# Patient Record
Sex: MALE | Race: OTHER | HISPANIC OR LATINO
[De-identification: names, ages, dates, MRNs, and addresses within clinical notes are randomized per-mention and may not be internally consistent; named-entity substitution may affect disease eponyms.]

---

## 2019-12-06 PROBLEM — Z00.00 ENCOUNTER FOR PREVENTIVE HEALTH EXAMINATION: Status: ACTIVE | Noted: 2019-12-06

## 2019-12-12 ENCOUNTER — APPOINTMENT (OUTPATIENT)
Dept: SURGERY | Facility: CLINIC | Age: 49
End: 2019-12-12
Payer: COMMERCIAL

## 2019-12-12 VITALS
DIASTOLIC BLOOD PRESSURE: 65 MMHG | SYSTOLIC BLOOD PRESSURE: 106 MMHG | WEIGHT: 217 LBS | BODY MASS INDEX: 34.06 KG/M2 | HEIGHT: 67 IN | HEART RATE: 56 BPM | TEMPERATURE: 98.4 F | OXYGEN SATURATION: 100 %

## 2019-12-12 DIAGNOSIS — Z78.9 OTHER SPECIFIED HEALTH STATUS: ICD-10-CM

## 2019-12-12 DIAGNOSIS — Z82.0 FAMILY HISTORY OF EPILEPSY AND OTHER DISEASES OF THE NERVOUS SYSTEM: ICD-10-CM

## 2019-12-12 PROCEDURE — 99243 OFF/OP CNSLTJ NEW/EST LOW 30: CPT

## 2019-12-12 NOTE — REVIEW OF SYSTEMS
[Fever] : no fever [Chills] : no chills [Chest Pain] : no chest pain [Lower Ext Edema] : no extremity edema [Shortness Of Breath] : no shortness of breath [Cough] : no cough [Abdominal Pain] : no abdominal pain [Dysuria] : no dysuria [Hesitancy] : no urinary hesitancy [Testicular Pain] : no testicular pain [Joint Swelling] : no joint swelling [Joint Stiffness] : no joint stiffness [Skin Lesions] : no skin lesions [Skin Wound] : no skin wound [Dizziness] : no dizziness [Anxiety] : no anxiety [Muscle Weakness] : no muscle weakness [Swollen Glands] : no swollen glands [FreeTextEntry8] : discomfort, right side of groin

## 2019-12-12 NOTE — PLAN
[FreeTextEntry1] : Patient with RIGHT inguinal hernia. Patient was informed of significance of findings. All the options, risks and benefits were discussed. The use of mesh and the small potential for infection, recurrence and other related complications were discussed. Patient wishes to proceed with surgery. We will plan for surgery on at the next available date. Patient agrees to obtain any necessary pre-operative evaluations and testing prior to surgery. Patient's questions and concerns addressed to patient's satisfaction, and patient verbalized an understanding of the information discussed.\par \par Will schedule for the surgery at [facility] [place]. \par \par \par

## 2019-12-12 NOTE — CONSULT LETTER
[Dear  ___] : Dear  [unfilled], [Consult Letter:] : I had the pleasure of evaluating your patient, [unfilled]. [Consult Closing:] : Thank you very much for allowing me to participate in the care of this patient.  If you have any questions, please do not hesitate to contact me. [Sincerely,] : Sincerely, [FreeTextEntry3] : Javy Blanton MD\par

## 2019-12-12 NOTE — HISTORY OF PRESENT ILLNESS
[de-identified] : 49 y.o M, no significant PMHX, presents w the cc of having some discomfort and unusual sensation to the right side of groin. He denies feeling a bulge to the area. Patient states he is worried he may have a hernia. Denies testicular swelling, no urinary symptoms. He reports recently having a corticosteroid injection to the RLE and concerned this may have something to do with the inguinal discomfort. He has reduced heavy lifting as part of his daily work and wears an abdominal binder. Patient states he had a sonogram to R. inguinal area and was told he has a hernia.

## 2019-12-12 NOTE — PHYSICAL EXAM
[Normal Breath Sounds] : Normal breath sounds [Normal Rate and Rhythm] : normal rate and rhythm [No Rash or Lesion] : No rash or lesion [Alert] : alert [Oriented to Person] : oriented to person [Oriented to Place] : oriented to place [Oriented to Time] : oriented to time [Calm] : calm [JVD] : no jugular venous distention  [de-identified] : A/Ox3; NAD. appears comfortable [de-identified] : EOMI [de-identified] : abd is soft, NT/ND\par  [de-identified] : +R. inguinal hernia, no testicular swelling  [de-identified] : +ROM, no joint swelling

## 2020-01-08 ENCOUNTER — APPOINTMENT (OUTPATIENT)
Dept: SURGERY | Facility: HOSPITAL | Age: 50
End: 2020-01-08

## 2020-02-25 DIAGNOSIS — K40.90 UNILATERAL INGUINAL HERNIA, W/OUT OBSTRUCTION OR GANGRENE, NOT SPECIFIED AS RECURRENT: ICD-10-CM

## 2020-03-06 ENCOUNTER — OUTPATIENT (OUTPATIENT)
Dept: OUTPATIENT SERVICES | Facility: HOSPITAL | Age: 50
LOS: 1 days | End: 2020-03-06
Payer: COMMERCIAL

## 2020-03-06 VITALS
RESPIRATION RATE: 16 BRPM | TEMPERATURE: 98 F | HEART RATE: 64 BPM | HEIGHT: 67 IN | OXYGEN SATURATION: 99 % | DIASTOLIC BLOOD PRESSURE: 73 MMHG | WEIGHT: 218.04 LBS | SYSTOLIC BLOOD PRESSURE: 114 MMHG

## 2020-03-06 DIAGNOSIS — K40.90 UNILATERAL INGUINAL HERNIA, WITHOUT OBSTRUCTION OR GANGRENE, NOT SPECIFIED AS RECURRENT: ICD-10-CM

## 2020-03-06 DIAGNOSIS — Z01.818 ENCOUNTER FOR OTHER PREPROCEDURAL EXAMINATION: ICD-10-CM

## 2020-03-06 PROCEDURE — G0463: CPT

## 2020-03-06 NOTE — H&P PST ADULT - NSANTHOSAYNRD_GEN_A_CORE
No. STUART screening performed.  STOP BANG Legend: 0-2 = LOW Risk; 3-4 = INTERMEDIATE Risk; 5-8 = HIGH Risk

## 2020-03-06 NOTE — H&P PST ADULT - NSICDXPROBLEM_GEN_ALL_CORE_FT
PROBLEM DIAGNOSES  Problem: Unilateral inguinal hernia, without obstruction or gangrene, not specified as recurrent  Assessment and Plan: Right Inguinal Hernia Repair with Mesh

## 2020-03-06 NOTE — H&P PST ADULT - REASON FOR ADMISSION
I had a feeling of  pain and needle in my right groin area in november 2019. My doctor did confirm that I had a hernia in my right grin, But I felt good after that and I cancelled my surgery. The same feeling started this February, he I am.  I want the surgery

## 2020-03-06 NOTE — H&P PST ADULT - HISTORY OF PRESENT ILLNESS
50yo male with history of Obesity, reports the above.  Patient denies pain at this time. He is scheduled for Right Inguinal Hernia Repair with Mesh on 3/13/20

## 2020-03-12 ENCOUNTER — TRANSCRIPTION ENCOUNTER (OUTPATIENT)
Age: 50
End: 2020-03-12

## 2020-03-13 ENCOUNTER — RESULT REVIEW (OUTPATIENT)
Age: 50
End: 2020-03-13

## 2020-03-13 ENCOUNTER — APPOINTMENT (OUTPATIENT)
Dept: SURGERY | Facility: HOSPITAL | Age: 50
End: 2020-03-13

## 2020-03-13 ENCOUNTER — OUTPATIENT (OUTPATIENT)
Dept: OUTPATIENT SERVICES | Facility: HOSPITAL | Age: 50
LOS: 1 days | End: 2020-03-13
Payer: COMMERCIAL

## 2020-03-13 VITALS
OXYGEN SATURATION: 97 % | DIASTOLIC BLOOD PRESSURE: 59 MMHG | SYSTOLIC BLOOD PRESSURE: 115 MMHG | TEMPERATURE: 98 F | HEART RATE: 67 BPM | RESPIRATION RATE: 17 BRPM

## 2020-03-13 VITALS
HEIGHT: 67 IN | WEIGHT: 218.04 LBS | HEART RATE: 56 BPM | OXYGEN SATURATION: 99 % | DIASTOLIC BLOOD PRESSURE: 65 MMHG | TEMPERATURE: 98 F | SYSTOLIC BLOOD PRESSURE: 115 MMHG | RESPIRATION RATE: 17 BRPM

## 2020-03-13 DIAGNOSIS — K40.90 UNILATERAL INGUINAL HERNIA, WITHOUT OBSTRUCTION OR GANGRENE, NOT SPECIFIED AS RECURRENT: ICD-10-CM

## 2020-03-13 PROCEDURE — C1781: CPT

## 2020-03-13 PROCEDURE — 49505 PRP I/HERN INIT REDUC >5 YR: CPT | Mod: RT

## 2020-03-13 PROCEDURE — 49505 PRP I/HERN INIT REDUC >5 YR: CPT

## 2020-03-13 PROCEDURE — 88304 TISSUE EXAM BY PATHOLOGIST: CPT

## 2020-03-13 PROCEDURE — 88304 TISSUE EXAM BY PATHOLOGIST: CPT | Mod: 26

## 2020-03-13 RX ORDER — SODIUM CHLORIDE 9 MG/ML
3 INJECTION INTRAMUSCULAR; INTRAVENOUS; SUBCUTANEOUS EVERY 8 HOURS
Refills: 0 | Status: DISCONTINUED | OUTPATIENT
Start: 2020-03-13 | End: 2020-03-13

## 2020-03-13 RX ORDER — ONDANSETRON 8 MG/1
4 TABLET, FILM COATED ORAL ONCE
Refills: 0 | Status: DISCONTINUED | OUTPATIENT
Start: 2020-03-13 | End: 2020-03-13

## 2020-03-13 RX ORDER — HYDROMORPHONE HYDROCHLORIDE 2 MG/ML
0.5 INJECTION INTRAMUSCULAR; INTRAVENOUS; SUBCUTANEOUS
Refills: 0 | Status: DISCONTINUED | OUTPATIENT
Start: 2020-03-13 | End: 2020-03-13

## 2020-03-13 RX ORDER — OXYCODONE AND ACETAMINOPHEN 5; 325 MG/1; MG/1
1 TABLET ORAL
Qty: 12 | Refills: 0
Start: 2020-03-13 | End: 2020-03-15

## 2020-03-13 RX ORDER — SODIUM CHLORIDE 9 MG/ML
1000 INJECTION, SOLUTION INTRAVENOUS
Refills: 0 | Status: DISCONTINUED | OUTPATIENT
Start: 2020-03-13 | End: 2020-03-13

## 2020-03-13 RX ORDER — HYDROMORPHONE HYDROCHLORIDE 2 MG/ML
1 INJECTION INTRAMUSCULAR; INTRAVENOUS; SUBCUTANEOUS
Refills: 0 | Status: DISCONTINUED | OUTPATIENT
Start: 2020-03-13 | End: 2020-03-13

## 2020-03-13 RX ADMIN — HYDROMORPHONE HYDROCHLORIDE 1 MILLIGRAM(S): 2 INJECTION INTRAMUSCULAR; INTRAVENOUS; SUBCUTANEOUS at 11:58

## 2020-03-13 RX ADMIN — HYDROMORPHONE HYDROCHLORIDE 1 MILLIGRAM(S): 2 INJECTION INTRAMUSCULAR; INTRAVENOUS; SUBCUTANEOUS at 11:28

## 2020-03-13 NOTE — ASU DISCHARGE PLAN (ADULT/PEDIATRIC) - CALL YOUR DOCTOR IF YOU HAVE ANY OF THE FOLLOWING:
Nausea and vomiting that does not stop/Wound/Surgical Site with redness, or foul smelling discharge or pus/Swelling that gets worse/Pain not relieved by Medications/Bleeding that does not stop/Fever greater than (need to indicate Fahrenheit or Celsius)

## 2020-03-13 NOTE — ASU DISCHARGE PLAN (ADULT/PEDIATRIC) - CARE PROVIDER_API CALL
Javy Blanton (MD)  Surgery  [address], [place] [id number]  Phone: [phone]  Fax: [phone]  Follow Up Time:

## 2020-03-17 LAB — SURGICAL PATHOLOGY STUDY: SIGNIFICANT CHANGE UP

## 2020-03-23 ENCOUNTER — APPOINTMENT (OUTPATIENT)
Dept: SURGERY | Facility: CLINIC | Age: 50
End: 2020-03-23
Payer: COMMERCIAL

## 2020-03-23 PROCEDURE — 99024 POSTOP FOLLOW-UP VISIT: CPT

## 2020-03-23 NOTE — ASSESSMENT
[FreeTextEntry1] : ASHLEY ALFARO presents to the office for postoperative visit today, he is s/p Repair of right inguinal hernia with mesh, 03/13/20. \par Patient offers no complaints. Denies fever, chills, or pain. Surgical wound is healing well. No signs of inflammation, infection or exudate. no recurrence. Patient reports good bowel movements and appetite.\par \par Patient is doing well, with excellent post-operative recovery. Surgical incision is healing well and as expected. There is no evidence of infection or complication, and patient is progressing as expected.\par

## 2020-03-23 NOTE — HISTORY OF PRESENT ILLNESS
[de-identified] : ASHLEY ALFARO presents to the office for postoperative visit today, he is s/p Repair of right inguinal hernia with mesh, 03/13/20. \par Patient offers no complaints. Denies fever, chills, or pain. Surgical wound is healing well. No signs of inflammation, infection or exudate. no recurrence. Patient reports good bowel movements and appetite.\par

## 2020-03-23 NOTE — PLAN
[FreeTextEntry1] : Post operative wound care, activity and restrictions/precautions were reinforced. \par Patient was instructed to refrain from any heavy lifting >10-15 lbs for at least 4 weeks post operatively. \par \par Patient's questions and concerns addressed.\par \par patient will follow up if needed. Warning signs, follow up, and restrictions were discussed with the patient.\par call with concerns

## 2020-03-23 NOTE — REASON FOR VISIT
[Post Op: _________] : a [unfilled] post op visit [Spouse] : spouse [FreeTextEntry1] : s/p Repair of right inguinal hernia with mesh, 03/13/20

## 2020-03-23 NOTE — PHYSICAL EXAM
[Normal Breath Sounds] : Normal breath sounds [Normal Rate and Rhythm] : normal rate and rhythm [No Rash or Lesion] : No rash or lesion [Alert] : alert [Oriented to Person] : oriented to person [Oriented to Place] : oriented to place [Oriented to Time] : oriented to time [Calm] : calm [JVD] : no jugular venous distention  [de-identified] : A/Ox3; NAD. appears comfortable [de-identified] : EOMI, Anicteric sclera [de-identified] : Surgical wound is healing well. No signs of inflammation, infection or exudate. no recurrence. [de-identified] : +ROM, no joint swelling

## 2021-07-30 ENCOUNTER — EMERGENCY (EMERGENCY)
Facility: HOSPITAL | Age: 51
LOS: 1 days | Discharge: HOME | End: 2021-07-30
Attending: EMERGENCY MEDICINE
Payer: COMMERCIAL

## 2021-07-30 VITALS
RESPIRATION RATE: 17 BRPM | TEMPERATURE: 98 F | DIASTOLIC BLOOD PRESSURE: 76 MMHG | SYSTOLIC BLOOD PRESSURE: 117 MMHG | WEIGHT: 218.04 LBS | OXYGEN SATURATION: 98 % | HEART RATE: 73 BPM | HEIGHT: 67 IN

## 2021-07-30 VITALS
HEART RATE: 69 BPM | SYSTOLIC BLOOD PRESSURE: 114 MMHG | OXYGEN SATURATION: 97 % | TEMPERATURE: 98 F | DIASTOLIC BLOOD PRESSURE: 74 MMHG | RESPIRATION RATE: 17 BRPM

## 2021-07-30 LAB
ALBUMIN SERPL ELPH-MCNC: 3.4 G/DL — LOW (ref 3.5–5)
ALP SERPL-CCNC: 102 U/L — SIGNIFICANT CHANGE UP (ref 40–120)
ALT FLD-CCNC: 72 U/L DA — HIGH (ref 10–60)
ANION GAP SERPL CALC-SCNC: 7 MMOL/L — SIGNIFICANT CHANGE UP (ref 5–17)
APPEARANCE UR: CLEAR — SIGNIFICANT CHANGE UP
AST SERPL-CCNC: 116 U/L — HIGH (ref 10–40)
BACTERIA # UR AUTO: ABNORMAL /HPF
BILIRUB SERPL-MCNC: 1.1 MG/DL — SIGNIFICANT CHANGE UP (ref 0.2–1.2)
BILIRUB UR-MCNC: NEGATIVE — SIGNIFICANT CHANGE UP
BUN SERPL-MCNC: 15 MG/DL — SIGNIFICANT CHANGE UP (ref 7–18)
CALCIUM SERPL-MCNC: 9.1 MG/DL — SIGNIFICANT CHANGE UP (ref 8.4–10.5)
CHLORIDE SERPL-SCNC: 107 MMOL/L — SIGNIFICANT CHANGE UP (ref 96–108)
CO2 SERPL-SCNC: 23 MMOL/L — SIGNIFICANT CHANGE UP (ref 22–31)
COLOR SPEC: YELLOW — SIGNIFICANT CHANGE UP
COMMENT - URINE: SIGNIFICANT CHANGE UP
CREAT SERPL-MCNC: 0.91 MG/DL — SIGNIFICANT CHANGE UP (ref 0.5–1.3)
DIFF PNL FLD: NEGATIVE — SIGNIFICANT CHANGE UP
EPI CELLS # UR: ABNORMAL /HPF
GLUCOSE SERPL-MCNC: 81 MG/DL — SIGNIFICANT CHANGE UP (ref 70–99)
GLUCOSE UR QL: NEGATIVE — SIGNIFICANT CHANGE UP
HCT VFR BLD CALC: 44 % — SIGNIFICANT CHANGE UP (ref 39–50)
HGB BLD-MCNC: 14.9 G/DL — SIGNIFICANT CHANGE UP (ref 13–17)
HYPOSEGMENTATION: PRESENT — SIGNIFICANT CHANGE UP
KETONES UR-MCNC: NEGATIVE — SIGNIFICANT CHANGE UP
LEUKOCYTE ESTERASE UR-ACNC: NEGATIVE — SIGNIFICANT CHANGE UP
LG PLATELETS BLD QL AUTO: SLIGHT — SIGNIFICANT CHANGE UP
MANUAL SMEAR VERIFICATION: SIGNIFICANT CHANGE UP
MCHC RBC-ENTMCNC: 29.3 PG — SIGNIFICANT CHANGE UP (ref 27–34)
MCHC RBC-ENTMCNC: 33.9 GM/DL — SIGNIFICANT CHANGE UP (ref 32–36)
MCV RBC AUTO: 86.6 FL — SIGNIFICANT CHANGE UP (ref 80–100)
NITRITE UR-MCNC: NEGATIVE — SIGNIFICANT CHANGE UP
NRBC # BLD: 0 /100 WBCS — SIGNIFICANT CHANGE UP (ref 0–0)
PH UR: 6 — SIGNIFICANT CHANGE UP (ref 5–8)
PLAT MORPH BLD: NORMAL — SIGNIFICANT CHANGE UP
PLATELET # BLD AUTO: 46 K/UL — LOW (ref 150–400)
PLATELET COUNT - ESTIMATE: ABNORMAL
POTASSIUM SERPL-MCNC: 3.6 MMOL/L — SIGNIFICANT CHANGE UP (ref 3.5–5.3)
POTASSIUM SERPL-SCNC: 3.6 MMOL/L — SIGNIFICANT CHANGE UP (ref 3.5–5.3)
PROT SERPL-MCNC: 8.8 G/DL — HIGH (ref 6–8.3)
PROT UR-MCNC: 30 MG/DL
RBC # BLD: 5.08 M/UL — SIGNIFICANT CHANGE UP (ref 4.2–5.8)
RBC # FLD: 14 % — SIGNIFICANT CHANGE UP (ref 10.3–14.5)
RBC BLD AUTO: NORMAL — SIGNIFICANT CHANGE UP
RBC CASTS # UR COMP ASSIST: SIGNIFICANT CHANGE UP /HPF (ref 0–2)
SARS-COV-2 RNA SPEC QL NAA+PROBE: SIGNIFICANT CHANGE UP
SODIUM SERPL-SCNC: 137 MMOL/L — SIGNIFICANT CHANGE UP (ref 135–145)
SP GR SPEC: 1.02 — SIGNIFICANT CHANGE UP (ref 1.01–1.02)
TOXIC GRANULES BLD QL SMEAR: PRESENT — SIGNIFICANT CHANGE UP
UROBILINOGEN FLD QL: 1
WBC # BLD: 4.43 K/UL — SIGNIFICANT CHANGE UP (ref 3.8–10.5)
WBC # FLD AUTO: 4.43 K/UL — SIGNIFICANT CHANGE UP (ref 3.8–10.5)
WBC UR QL: SIGNIFICANT CHANGE UP /HPF (ref 0–5)

## 2021-07-30 PROCEDURE — 70450 CT HEAD/BRAIN W/O DYE: CPT | Mod: 26,MA

## 2021-07-30 PROCEDURE — 99285 EMERGENCY DEPT VISIT HI MDM: CPT

## 2021-07-30 RX ORDER — METOCLOPRAMIDE HCL 10 MG
10 TABLET ORAL ONCE
Refills: 0 | Status: COMPLETED | OUTPATIENT
Start: 2021-07-30 | End: 2021-07-30

## 2021-07-30 RX ORDER — ACETAMINOPHEN 500 MG
650 TABLET ORAL ONCE
Refills: 0 | Status: COMPLETED | OUTPATIENT
Start: 2021-07-30 | End: 2021-07-30

## 2021-07-30 RX ADMIN — Medication 650 MILLIGRAM(S): at 19:47

## 2021-07-30 RX ADMIN — Medication 10 MILLIGRAM(S): at 19:47

## 2021-07-30 NOTE — ED PROVIDER NOTE - NSFOLLOWUPCLINICS_GEN_ALL_ED_FT
[facility]  Internal Medicine  [address] [place] [address]  Phone: [phone]  Fax: [phone]

## 2021-07-30 NOTE — ED PROVIDER NOTE - PROGRESS NOTE DETAILS
Benny HILL: Received sign out from Dr. Smith.  CT a/p reported: No acute findings to explain the patient's symptoms.    Multiple nonspecific enlarged upper abdominal, retroperitoneal and inguinal nodes. Differential diagnosis includes infectious, inflammatory and malignant etiologies, including lymphoma and metastatic disease. Clinical correlation is recommended with further workup as warranted. Pt is well appearing, no guarding to repeat abdominal palpation, able to eat and drink without vomiting. Pt is well appearing, has no new complaints and able to walk with normal gait. Pt is stable for discharge and follow up with medical doctor. Pt educated on care and need for follow up. Discussed anticipatory guidance and return precautions. Questions answered. I had a detailed discussion with the patient and/or guardian regarding the historical points, exam findings, and any diagnostic results supporting the discharge diagnosis.    Pt informed of CT report and will f/u with PMD.

## 2021-07-30 NOTE — ED PROVIDER NOTE - PHYSICAL EXAMINATION
General: pt lying in stretcher, appears stated age and is not in distress  HEENT: AT/NC, pink conjunctiva, anicteric sclerae, EOMI, PERRLA, TMs smooth, grey, intact, with normal landmarks, nasal mucosa pink, no discharge, turbinates not enlarged; moist mucus membranes, tongue well-papillated, good dentition; posterior pharynx shows no erythema or exudates;   Neck: supple, full ROM, trachea midline, no JVD, no cervical LAD, no midline ttp or stepoffs  Lungs: symmetric excursion, b/l clear vesicular breath sounds with no wheezes, crackles, or rhonchi  Heart: rrr, S1, S2 normal; no S3 or S4; no murmurs or rubs  Abd: normal bowel sounds; soft, nontender; negative McBurney's point tenderness, negative Greco's sign, no rebound, no guarding, spleen non-palpable; no hepatomegaly, no masses  Back: no midline spinal tenderness or stepoffs, no costovertebral angle tenderness  Extremities: no clubbing, cyanosis, or edema; no palpable deformities or fractures  Skin: good turgor; no rashes, petechiae, ecchymoses, or jaundice  Pulses: radial, posterior tibialis (PT), dorsalis pedis (DP) all 2+ & symmetric  Neuro: awake, alert, responsive; oriented to person, place and time; cranial nerves intact, EOMI, intact jaw movement, intact facial sensation, no facial asymmetry, hearing intact; no nystagmus, tongue midline; Motor: Normal tone in upper and lower extremities bilaterally strength 5/5; Sensory: intact to pinprick and light touch; Cerebellar: finger-to-nose intact; normal steady gait; negative Romberg’s sign; DTR: biceps, triceps, patellar, 2+, no pronator drift General: pt lying in stretcher, appears stated age and is not in distress  HEENT: AT/NC, pink conjunctiva, anicteric sclerae, EOMI, PERRLA, mmm  Neck: supple, full ROM, trachea midline, no JVD, no cervical LAD, no midline ttp or stepoffs, negative Brudzinski sign  Lungs: symmetric excursion, b/l clear vesicular breath sounds with no wheezes, crackles, or rhonchi  Heart: rrr, S1, S2 normal; no S3 or S4; no murmurs or rubs  Abd: normal bowel sounds; soft, nontender; negative McBurney's point tenderness, negative Greco's sign, no rebound, no guarding, spleen non-palpable; no hepatomegaly, no masses  Back: no midline spinal tenderness or stepoffs, + right flank costovertebral angle tenderness  Extremities: no clubbing, cyanosis, or edema; no palpable deformities or fractures  Skin: good turgor; no rashes, petechiae, ecchymoses, or jaundice  Pulses: radial, posterior tibialis (PT), dorsalis pedis (DP) all 2+ & symmetric  Neuro: awake, alert, responsive; oriented to person, place and time; cranial nerves intact, EOMI, intact jaw movement, intact facial sensation, no facial asymmetry, hearing intact; no nystagmus, tongue midline; Motor: Normal tone in upper and lower extremities bilaterally strength 5/5; Sensory: intact  normal steady gait

## 2021-07-30 NOTE — ED PROVIDER NOTE - NSFOLLOWUPINSTRUCTIONS_ED_ALL_ED_FT
Return to ER immediately if your abdominal pain does not improve, worsens, or persists, if you have fever, vomiting,  blood in stools or you have black stools, unable to eat or drink, have worsening/persistent diarrhea or constipation, any concerns. See your doctor as soon as possible (within 1-2 days).   If you need further assistance for appointments you can contact the [facility] Care Coordinator at [phone]. In addition our outpatient [facility] is located at [address] [place] [address], tele: [phone]. Return to ER immediately if your abdominal pain does not improve, worsens, or persists, if you have fever, vomiting,  blood in stools or you have black stools, unable to eat or drink, have worsening/persistent diarrhea or constipation, any concerns. See your doctor as soon as possible (within 1-2 days).   If you need further assistance for appointments you can contact the [facility] Care Coordinator at [phone]. In addition our outpatient [facility] is located at [address] [place] [address], tele: [phone].    As per radiologist, you should follow up with repeat abdominal cat scan or possible MRI to assess lymph nodes, you should also follow up with hemtologist/oncologist.

## 2021-07-30 NOTE — ED PROVIDER NOTE - OBJECTIVE STATEMENT
49 y/o M patient c/o x5 weeks of left sided headache and intermittent fevers. Patient reports he has a daily headache for 5 weeks associated with fevers (t-max 100.7), with a fever last night of 100.4. Endorses right sided lower back pain and urinary frequency. Patient states headache right now is 2 out of 10. Denies cough, cold, neck stiffness, abdominal pain, focal weakness, numbness, or any other complaints.

## 2021-07-30 NOTE — ED PROVIDER NOTE - PATIENT PORTAL LINK FT
You can access the FollowMyHealth Patient Portal offered by [facility] by registering at the following website: [email or web address]. By joining [employer]’s FollowMyHealth portal, you will also be able to view your health information using other applications (apps) compatible with our system.

## 2021-07-30 NOTE — ED PROVIDER NOTE - CLINICAL SUMMARY MEDICAL DECISION MAKING FREE TEXT BOX
Pt w/ aforementioned presentation concerning for but not limited to hepatobiliary disease, nephrolithiasis, pyelonephritis, colitis, other infection. will get labs, imaging, treat symptoms, monitor and reassess    Pt signed out to Dr. Zapata pending CT a/p

## 2021-07-31 PROCEDURE — 70450 CT HEAD/BRAIN W/O DYE: CPT | Mod: MA

## 2021-07-31 PROCEDURE — 85027 COMPLETE CBC AUTOMATED: CPT

## 2021-07-31 PROCEDURE — 99284 EMERGENCY DEPT VISIT MOD MDM: CPT | Mod: 25

## 2021-07-31 PROCEDURE — 36415 COLL VENOUS BLD VENIPUNCTURE: CPT

## 2021-07-31 PROCEDURE — 80053 COMPREHEN METABOLIC PANEL: CPT

## 2021-07-31 PROCEDURE — 74177 CT ABD & PELVIS W/CONTRAST: CPT | Mod: MA

## 2021-07-31 PROCEDURE — 81001 URINALYSIS AUTO W/SCOPE: CPT

## 2021-07-31 PROCEDURE — 74177 CT ABD & PELVIS W/CONTRAST: CPT | Mod: 26,MA

## 2021-07-31 PROCEDURE — 96374 THER/PROPH/DIAG INJ IV PUSH: CPT | Mod: XU

## 2021-07-31 PROCEDURE — 87635 SARS-COV-2 COVID-19 AMP PRB: CPT

## 2021-09-17 ENCOUNTER — APPOINTMENT (OUTPATIENT)
Dept: COLORECTAL SURGERY | Facility: CLINIC | Age: 51
End: 2021-09-17
Payer: COMMERCIAL

## 2021-09-17 VITALS
TEMPERATURE: 98.1 F | HEART RATE: 60 BPM | SYSTOLIC BLOOD PRESSURE: 106 MMHG | DIASTOLIC BLOOD PRESSURE: 68 MMHG | HEIGHT: 67 IN | BODY MASS INDEX: 32.33 KG/M2 | WEIGHT: 206 LBS

## 2021-09-17 PROCEDURE — 99202 OFFICE O/P NEW SF 15 MIN: CPT | Mod: 25

## 2021-09-17 PROCEDURE — 46600 DIAGNOSTIC ANOSCOPY SPX: CPT

## 2021-09-17 NOTE — HISTORY OF PRESENT ILLNESS
[FreeTextEntry1] : 51 y/o M presents for evaluation of colonoscopy findings, referred by Dr. Cuevas\par PSH of right inguinal hernia repair March 2020\par \par Sigmoidoscopy completed 8/31/21\par - medium hemorrhoids in the anal canal\par - irregular mucosa in distal rectum just proximal to the dentate line. Multiple biopsies taken \par \par Pathology \par A.) rectum, distal, biopsy: colonic mucosa with benign lymphoid aggregates. Features of mucosal prolapse noted\par \par Previous colonoscopy completed 3 years ago, 3 benign polyps removed per patient \par \par Reports intermittent anal pain for the past 6 months to 1 year ago with BM's that takes several hours to resolves. States the pain is more on the right than left\par Denies itching or bleeding\par No use of OTC hemorrhoid medications or sitz baths. Prescribed hydrocortisone ointment BID following colonoscopy. Using as prescribed with slight improvement in symptoms \par BH: 1-2 times daily\par Admits to straining with BM's\par Taking fiber supplement daily for the past 2 weeks with noted improvement\par Working on dietary fiber intake. Currently drinking 4-5 16 oz. bottles of water daily plus seltzer occasionally \par (-) anal receptive sex\par Denies [facility] GI disorders or colorectal cancer\par No ASA/NSAIDs last 7 days

## 2021-09-17 NOTE — ASSESSMENT
[FreeTextEntry1] : Reviewed with the patient that I suspect his symptoms of anal pain are consistent with potential scarring from prior anal fissure disease.  Recommend a course of topical nifedipine ointment and continued efforts at fiber supplementation and increase hydration.  No evidence of significant mucosal abnormalities on examination today however large internal hemorrhoids were apparent.  Recommend return in 4 to 6 weeks for repeat assessment.

## 2021-09-17 NOTE — PHYSICAL EXAM
[Excoriation] : no perianal excoriation [Normal] : was normal [Anterior] : anteriorly [None] : there was no rectal mass  [de-identified] : Area along right anterior anal verge consistent with scarring from prior potential anal fissure. [FreeTextEntry1] : Medical assistant was present for the entire exam.\par \par Anoscopy was performed for evaluation of the patients rectal bleeding  history .\par The risks, benefits and alternatives were reviewed.\par \par A lighted anoscope was passed into the anal canal and the entire anal mucosal surface was inspected..  \par The findings revealed moderate internal hemorrhoids.\par No masses or lesions were identified.\par \par

## 2021-10-22 ENCOUNTER — APPOINTMENT (OUTPATIENT)
Dept: COLORECTAL SURGERY | Facility: CLINIC | Age: 51
End: 2021-10-22
Payer: COMMERCIAL

## 2021-10-22 VITALS
TEMPERATURE: 97.3 F | SYSTOLIC BLOOD PRESSURE: 104 MMHG | WEIGHT: 207 LBS | BODY MASS INDEX: 32.49 KG/M2 | DIASTOLIC BLOOD PRESSURE: 66 MMHG | HEIGHT: 67 IN | HEART RATE: 76 BPM

## 2021-10-22 DIAGNOSIS — K60.2 ANAL FISSURE, UNSPECIFIED: ICD-10-CM

## 2021-10-22 PROCEDURE — 99213 OFFICE O/P EST LOW 20 MIN: CPT

## 2021-10-22 NOTE — ASSESSMENT
[FreeTextEntry1] : Symptoms resolved of prior anal fissure disease.\par \par I have recommended continued aggressive efforts to improve bowel habits with fiber supplementation and increase hydration.

## 2021-10-22 NOTE — HISTORY OF PRESENT ILLNESS
[FreeTextEntry1] : 51 y/o M presents for f/u anal fissure\par \par Last seen in the office on 9/17/21. Anal fissure Area along right anterior anal verge consistent with scarring from prior potential anal fissure. Moderate internal hemorrhoids noted on anoscopy. Patient started on Diltiazem ointment TID \par Pt has been applying medication twice daily and Calmol-4 suppository at bedtime with great improvement. Denies pain, itching, burning or bleeding.\par He has been avoiding red meat and increasing dietary fiber and water\par \par BH: twice daily, admits to slight straining\par Denies constipation, diarrhea\par Takes Metamucil daily\par Denies stool softeners,laxatives\par \par Sigmoidoscopy completed 8/31/21. Hemorrhoids and  irregular mucosa in distal rectum just proximal to the dentate line noted\par Last colonoscopy completed 2017/2018, 3 benign polyps removed per patient\par (-) anal receptive sex\par Denies [facility] GI disorders or colorectal cancer\par Denies ASA/NSAIDs last 7 days \par \par

## 2022-02-04 NOTE — H&P PST ADULT - HEIGHT IN CM
Discharge instructions reviewed and understanding verbalized per pt.  and daughter Desire. Patient aware start Eliquis tonight. Patient practiced incentive spirometry & aware use hourly while awake next couple days. Report given to [facility] PACU RN to complete hand-off procedure. 170.18

## 2022-06-24 NOTE — ASU PATIENT PROFILE, ADULT - AS SC BRADEN NUTRITION
Clive Benson, I saw Arie in the clinic last night. He continues to have lymph node swelling, which I suspect is from his scalp rash. Lymph nodes are quite tender, so he is being treated with a round of Keflex. I advised follow-up with Dermatology for the skin rash and to follow-up with you if lymph node swelling continues after course of antibiotics. We discussed US in clinic, but this is not typically done in Urgent care and he was not able to be seen at the acute and diagnostic center. I suspect that if his swelling does not get better with antibiotics that he would ultimately need a soft tissue US of the head and neck. I do not want to step on your toes, or set unrealistic expectations for the patient.   Thank you! Diaen Spicer
(4) excellent

## 2023-02-23 ENCOUNTER — INPATIENT (INPATIENT)
Facility: HOSPITAL | Age: 53
LOS: 2 days | Discharge: HOME | DRG: 866 | End: 2023-02-26
Attending: STUDENT IN AN ORGANIZED HEALTH CARE EDUCATION/TRAINING PROGRAM | Admitting: STUDENT IN AN ORGANIZED HEALTH CARE EDUCATION/TRAINING PROGRAM
Payer: COMMERCIAL

## 2023-02-23 VITALS
SYSTOLIC BLOOD PRESSURE: 145 MMHG | OXYGEN SATURATION: 95 % | DIASTOLIC BLOOD PRESSURE: 81 MMHG | HEART RATE: 78 BPM | RESPIRATION RATE: 16 BRPM | WEIGHT: 218.04 LBS | TEMPERATURE: 98 F

## 2023-02-23 DIAGNOSIS — Z29.9 ENCOUNTER FOR PROPHYLACTIC MEASURES, UNSPECIFIED: ICD-10-CM

## 2023-02-23 DIAGNOSIS — L03.90 CELLULITIS, UNSPECIFIED: ICD-10-CM

## 2023-02-23 DIAGNOSIS — L03.211 CELLULITIS OF FACE: ICD-10-CM

## 2023-02-23 DIAGNOSIS — B00.7 DISSEMINATED HERPESVIRAL DISEASE: ICD-10-CM

## 2023-02-23 DIAGNOSIS — Z98.890 OTHER SPECIFIED POSTPROCEDURAL STATES: Chronic | ICD-10-CM

## 2023-02-23 LAB
ALBUMIN SERPL ELPH-MCNC: 3.9 G/DL — SIGNIFICANT CHANGE UP (ref 3.5–5)
ALP SERPL-CCNC: 106 U/L — SIGNIFICANT CHANGE UP (ref 40–120)
ALT FLD-CCNC: 63 U/L DA — HIGH (ref 10–60)
ANION GAP SERPL CALC-SCNC: 5 MMOL/L — SIGNIFICANT CHANGE UP (ref 5–17)
AST SERPL-CCNC: 51 U/L — HIGH (ref 10–40)
BASOPHILS # BLD AUTO: 0.03 K/UL — SIGNIFICANT CHANGE UP (ref 0–0.2)
BASOPHILS NFR BLD AUTO: 0.7 % — SIGNIFICANT CHANGE UP (ref 0–2)
BILIRUB SERPL-MCNC: 1.5 MG/DL — HIGH (ref 0.2–1.2)
BUN SERPL-MCNC: 12 MG/DL — SIGNIFICANT CHANGE UP (ref 7–18)
CALCIUM SERPL-MCNC: 9.4 MG/DL — SIGNIFICANT CHANGE UP (ref 8.4–10.5)
CHLORIDE SERPL-SCNC: 106 MMOL/L — SIGNIFICANT CHANGE UP (ref 96–108)
CO2 SERPL-SCNC: 27 MMOL/L — SIGNIFICANT CHANGE UP (ref 22–31)
CREAT SERPL-MCNC: 0.98 MG/DL — SIGNIFICANT CHANGE UP (ref 0.5–1.3)
EGFR: 92 ML/MIN/1.73M2 — SIGNIFICANT CHANGE UP
EOSINOPHIL # BLD AUTO: 0.05 K/UL — SIGNIFICANT CHANGE UP (ref 0–0.5)
EOSINOPHIL NFR BLD AUTO: 1.1 % — SIGNIFICANT CHANGE UP (ref 0–6)
GLUCOSE SERPL-MCNC: 113 MG/DL — HIGH (ref 70–99)
HCT VFR BLD CALC: 49 % — SIGNIFICANT CHANGE UP (ref 39–50)
HGB BLD-MCNC: 16.2 G/DL — SIGNIFICANT CHANGE UP (ref 13–17)
IMM GRANULOCYTES NFR BLD AUTO: 0.4 % — SIGNIFICANT CHANGE UP (ref 0–0.9)
LACTATE SERPL-SCNC: 1.8 MMOL/L — SIGNIFICANT CHANGE UP (ref 0.7–2)
LYMPHOCYTES # BLD AUTO: 1.65 K/UL — SIGNIFICANT CHANGE UP (ref 1–3.3)
LYMPHOCYTES # BLD AUTO: 36.1 % — SIGNIFICANT CHANGE UP (ref 13–44)
MCHC RBC-ENTMCNC: 29 PG — SIGNIFICANT CHANGE UP (ref 27–34)
MCHC RBC-ENTMCNC: 33.1 GM/DL — SIGNIFICANT CHANGE UP (ref 32–36)
MCV RBC AUTO: 87.8 FL — SIGNIFICANT CHANGE UP (ref 80–100)
MONOCYTES # BLD AUTO: 0.48 K/UL — SIGNIFICANT CHANGE UP (ref 0–0.9)
MONOCYTES NFR BLD AUTO: 10.5 % — SIGNIFICANT CHANGE UP (ref 2–14)
NEUTROPHILS # BLD AUTO: 2.34 K/UL — SIGNIFICANT CHANGE UP (ref 1.8–7.4)
NEUTROPHILS NFR BLD AUTO: 51.2 % — SIGNIFICANT CHANGE UP (ref 43–77)
NRBC # BLD: 0 /100 WBCS — SIGNIFICANT CHANGE UP (ref 0–0)
PLATELET # BLD AUTO: 150 K/UL — SIGNIFICANT CHANGE UP (ref 150–400)
POTASSIUM SERPL-MCNC: 3.7 MMOL/L — SIGNIFICANT CHANGE UP (ref 3.5–5.3)
POTASSIUM SERPL-SCNC: 3.7 MMOL/L — SIGNIFICANT CHANGE UP (ref 3.5–5.3)
PROT SERPL-MCNC: 8.8 G/DL — HIGH (ref 6–8.3)
RBC # BLD: 5.58 M/UL — SIGNIFICANT CHANGE UP (ref 4.2–5.8)
RBC # FLD: 13 % — SIGNIFICANT CHANGE UP (ref 10.3–14.5)
SARS-COV-2 RNA SPEC QL NAA+PROBE: SIGNIFICANT CHANGE UP
SODIUM SERPL-SCNC: 138 MMOL/L — SIGNIFICANT CHANGE UP (ref 135–145)
WBC # BLD: 4.57 K/UL — SIGNIFICANT CHANGE UP (ref 3.8–10.5)
WBC # FLD AUTO: 4.57 K/UL — SIGNIFICANT CHANGE UP (ref 3.8–10.5)

## 2023-02-23 PROCEDURE — 99285 EMERGENCY DEPT VISIT HI MDM: CPT

## 2023-02-23 PROCEDURE — 70491 CT SOFT TISSUE NECK W/DYE: CPT | Mod: 26,MA

## 2023-02-23 PROCEDURE — 99222 1ST HOSP IP/OBS MODERATE 55: CPT | Mod: GC

## 2023-02-23 RX ORDER — CEFAZOLIN SODIUM 1 G
2000 VIAL (EA) INJECTION EVERY 8 HOURS
Refills: 0 | Status: DISCONTINUED | OUTPATIENT
Start: 2023-02-23 | End: 2023-02-25

## 2023-02-23 RX ORDER — ACETAMINOPHEN 500 MG
650 TABLET ORAL EVERY 6 HOURS
Refills: 0 | Status: DISCONTINUED | OUTPATIENT
Start: 2023-02-23 | End: 2023-02-26

## 2023-02-23 RX ORDER — VALACYCLOVIR 500 MG/1
1000 TABLET, FILM COATED ORAL THREE TIMES A DAY
Refills: 0 | Status: DISCONTINUED | OUTPATIENT
Start: 2023-02-23 | End: 2023-02-26

## 2023-02-23 RX ORDER — KETOROLAC TROMETHAMINE 30 MG/ML
30 SYRINGE (ML) INJECTION EVERY 6 HOURS
Refills: 0 | Status: DISCONTINUED | OUTPATIENT
Start: 2023-02-23 | End: 2023-02-26

## 2023-02-23 RX ORDER — ONDANSETRON 8 MG/1
4 TABLET, FILM COATED ORAL EVERY 8 HOURS
Refills: 0 | Status: DISCONTINUED | OUTPATIENT
Start: 2023-02-23 | End: 2023-02-26

## 2023-02-23 RX ORDER — TRAMADOL HYDROCHLORIDE 50 MG/1
50 TABLET ORAL EVERY 6 HOURS
Refills: 0 | Status: DISCONTINUED | OUTPATIENT
Start: 2023-02-23 | End: 2023-02-26

## 2023-02-23 RX ORDER — ENOXAPARIN SODIUM 100 MG/ML
40 INJECTION SUBCUTANEOUS EVERY 24 HOURS
Refills: 0 | Status: DISCONTINUED | OUTPATIENT
Start: 2023-02-23 | End: 2023-02-26

## 2023-02-23 RX ORDER — VALACYCLOVIR 500 MG/1
1000 TABLET, FILM COATED ORAL ONCE
Refills: 0 | Status: COMPLETED | OUTPATIENT
Start: 2023-02-23 | End: 2023-02-23

## 2023-02-23 RX ORDER — LANOLIN ALCOHOL/MO/W.PET/CERES
3 CREAM (GRAM) TOPICAL AT BEDTIME
Refills: 0 | Status: DISCONTINUED | OUTPATIENT
Start: 2023-02-23 | End: 2023-02-26

## 2023-02-23 RX ORDER — KETOROLAC TROMETHAMINE 30 MG/ML
15 SYRINGE (ML) INJECTION ONCE
Refills: 0 | Status: DISCONTINUED | OUTPATIENT
Start: 2023-02-23 | End: 2023-02-23

## 2023-02-23 RX ORDER — VANCOMYCIN HCL 1 G
1000 VIAL (EA) INTRAVENOUS ONCE
Refills: 0 | Status: COMPLETED | OUTPATIENT
Start: 2023-02-23 | End: 2023-02-23

## 2023-02-23 RX ORDER — PIPERACILLIN AND TAZOBACTAM 4; .5 G/20ML; G/20ML
3.38 INJECTION, POWDER, LYOPHILIZED, FOR SOLUTION INTRAVENOUS ONCE
Refills: 0 | Status: COMPLETED | OUTPATIENT
Start: 2023-02-23 | End: 2023-02-23

## 2023-02-23 RX ORDER — GABAPENTIN 400 MG/1
100 CAPSULE ORAL
Refills: 0 | Status: DISCONTINUED | OUTPATIENT
Start: 2023-02-23 | End: 2023-02-26

## 2023-02-23 RX ADMIN — PIPERACILLIN AND TAZOBACTAM 3.38 GRAM(S): 4; .5 INJECTION, POWDER, LYOPHILIZED, FOR SOLUTION INTRAVENOUS at 10:44

## 2023-02-23 RX ADMIN — Medication 15 MILLIGRAM(S): at 10:44

## 2023-02-23 RX ADMIN — VALACYCLOVIR 1000 MILLIGRAM(S): 500 TABLET, FILM COATED ORAL at 21:47

## 2023-02-23 RX ADMIN — Medication 1000 MILLIGRAM(S): at 11:19

## 2023-02-23 RX ADMIN — Medication 15 MILLIGRAM(S): at 10:14

## 2023-02-23 RX ADMIN — Medication 250 MILLIGRAM(S): at 10:19

## 2023-02-23 RX ADMIN — Medication 100 MILLIGRAM(S): at 21:47

## 2023-02-23 RX ADMIN — VALACYCLOVIR 1000 MILLIGRAM(S): 500 TABLET, FILM COATED ORAL at 10:51

## 2023-02-23 RX ADMIN — Medication 650 MILLIGRAM(S): at 21:41

## 2023-02-23 RX ADMIN — PIPERACILLIN AND TAZOBACTAM 200 GRAM(S): 4; .5 INJECTION, POWDER, LYOPHILIZED, FOR SOLUTION INTRAVENOUS at 10:16

## 2023-02-23 NOTE — H&P ADULT - NSHPREVIEWOFSYSTEMS_GEN_ALL_CORE
REVIEW OF SYSTEMS:    CONSTITUTIONAL: No weakness, fevers or chills  EYES/ENT: No visual changes;  No vertigo or throat pain   NECK: No pain or stiffness  RESPIRATORY: No cough, wheezing, hemoptysis; No shortness of breath  CARDIOVASCULAR: No chest pain or palpitations  GASTROINTESTINAL: No abdominal or epigastric pain. No nausea, vomiting, or hematemesis; No diarrhea or constipation. No melena or hematochezia.  GENITOURINARY: No dysuria, frequency or hematuria  NEUROLOGICAL: No numbness or weakness  SKIN: + diffuse painful, itchy lesions

## 2023-02-23 NOTE — ED PROVIDER NOTE - PROGRESS NOTE DETAILS
CT soft tissue neck: RIGHT buccal dermal thickening and irregularity consistent   with patient's known rash. Underlying subcutaneous edema and thickening   of the platysma noted consistent with cellulitis. Minimal edema in the   submental triangle and about the RIGHT strap muscles. No drainable   abscess is seen.    Labs are ok but will admit to Medicine given severity of rash and rapidity of progression.

## 2023-02-23 NOTE — H&P ADULT - NSHPPHYSICALEXAM_GEN_ALL_CORE
LOS:     VITALS:   T(C): 36.7 (02-23-23 @ 15:28), Max: 36.8 (02-23-23 @ 09:25)  HR: 67 (02-23-23 @ 15:28) (67 - 78)  BP: 128/81 (02-23-23 @ 15:28) (128/81 - 145/81)  RR: 17 (02-23-23 @ 15:28) (16 - 17)  SpO2: 98% (02-23-23 @ 15:28) (95% - 98%)    GENERAL: NAD, lying in bed comfortably  HEAD:  Atraumatic, Normocephalic  EYES: EOMI, PERRLA, conjunctiva and sclera clear  ENT: Moist mucous membranes  NECK: Supple, No JVD  CHEST/LUNG: Clear to auscultation bilaterally; No rales, rhonchi, wheezing, or rubs. Unlabored respirations  HEART: Regular rate and rhythm; No murmurs, rubs, or gallops  ABDOMEN: BSx4; Soft, nontender, nondistended  EXTREMITIES:  2+ Peripheral Pulses, brisk capillary refill. No clubbing, cyanosis, or edema  NERVOUS SYSTEM:  A&Ox3, no focal deficits   SKIN: + grouped vesicles on an erythematous base on right face and scalp, + draining right facial crusted over plaque, + right earlobe erythema, warmth and swelling, + scattered pustules on B/L arms and chest, one chest lesion scabbed and crusted

## 2023-02-23 NOTE — ED PROVIDER NOTE - OBJECTIVE STATEMENT
52-year-old male no medical hx presenitng with rash/swelling in the R side of his face. Started with a pimple on his lower right face 4 days ago, which he popped and since then he has noted redness and swelling from the pimple site up his face up to his R ear. Notes his earlobe is red and swollen as well, and he cannot hear anything out of his R ear. Rash extends down to his chest. +painful. No visual changes or facial paralysis. Went to his doctor yesterday who prescribed doxycycline which he took for the first time this morning, but notes progressive worsening so he is here in the ER.

## 2023-02-23 NOTE — ED PROVIDER NOTE - CLINICAL SUMMARY MEDICAL DECISION MAKING FREE TEXT BOX
52-year-old male no medical hx presenitng with erythematous vesicualr rash/swelling in the R side of his face, R neck, and R side of chest - possibly disseminated zoster versus cellulitis versus abscess. WIll check labs, perform CT max face w/IV contrast, provide abx and valacyclovir, and reassess. Anticipate admission. 52-year-old male no medical hx presenitng with erythematous vesicualr rash/swelling in the R side of his face, R neck, and R side of chest - possibly disseminated zoster versus cellulitis versus abscess. WIll check labs, perform CT soft tissue neck w/IV contrast, provide abx and valacyclovir, and reassess. Anticipate admission.

## 2023-02-23 NOTE — H&P ADULT - NSHPSOCIALHISTORY_GEN_ALL_CORE
Pt lives at home with his wife and children. States he is unsure about his childhood vaccination hx. Denies alcohol, tobacco or illicit drug use.

## 2023-02-23 NOTE — H&P ADULT - ATTENDING COMMENTS
. Patient is a 52 y/o male from home w/ no PMH and PSH of Rt inguinal hernia repair p/w worsening Rt sided facial lesions and swelling x few days. Pt reports that he first developed a pimple around his chin on the Rt side on Sunday, later he noticed multiple other vesicles developing on his face, ear and even scalp- all lesions on Rt side. He described it as painful lesions, now w/ some yellowish crusting.  He used over the counter benadryl and topical steroids which made it worse. He went to his PCP yesterday who started him on doxycycline. Pt denies having any fever, chills, nausea, vomiting or systemic findings. He denies hx of recent abx use, hospitalization, sick contacts. 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   In ED, patient's VS were stable. He was given valtrex, IV vancomycin and Zosyn.     Labs reviewed    PE as above   Skin: multiple crops of vesicles w/ yellowish crusting in Rt trigeminal area w/ some lesions on neck-C3-4 region- no eye involvement    A/P:  #Disseminated Herpes Zoster w/ super-imposed bacterial infection   #DVT ppx     Plan:  -Patient noted w/ herpetic lesions in Rt trigeminal distribution along w/ additional vesicles and concern for super-imposed bacterial infection. S/p IV vancomycin, zosyn and valtrex in ED.  -Would start valtrex and cefazolin   -pain control, gabapentin for neuropathic pain  -Check HIV panel, Hba1C

## 2023-02-23 NOTE — H&P ADULT - PROBLEM SELECTOR PLAN 1
- p/w scattered vesicles, mainly on face, present on chest, scalp and arm  - start on Valtrex 1000mg TID for HSV  - start on Cefazolin for cellulitus/soft tissue infection  - start on gabapentin 100mg BID for neuropathic pain  - start on PRN pain regimen: tylenol for mild, tramadol for moderate and ketorolac for severe pain  - f/u HIV test  - monitor for ocular or neurological symptoms

## 2023-02-23 NOTE — ED PROVIDER NOTE - SKIN, MLM
R face with erythema, swelling, multiple vesicles across V2 and V3 dermatomes, R earlobe swollen and erythematous as well, multiple areas of purulent crusting. Similar rash on R side of neck and over R side of upper chest.

## 2023-02-23 NOTE — H&P ADULT - PROBLEM SELECTOR PLAN 2
- CT Neck Soft Tissue shows RIGHT buccal dermal thickening and irregularity consistent with patient's known rash. Underlying subcutaneous edema and thickening of the platysma noted consistent with cellulitis. Minimal edema in the submental triangle and about the RIGHT strap muscles. No drainable abscess is seen.  - start on Cefazolin for cellulitus/soft tissue infection  - f/u blood cultures

## 2023-02-23 NOTE — H&P ADULT - HISTORY OF PRESENT ILLNESS
52 year old male, from home, no PMH, presents to the ED with right facial swelling and pain. He states that 5 days ago, he noticed a small pimple on his right cheek which he popped. During the next few days, he began to have increased itchiness, pain and swelling of his right cheek, extending into his right ear lobe. He states he had associated hearing changes in the right ear. Yesterday he noticed new lesions in his scalp, chest and arms. He went to his PCP who prescribed him doxycycline and topical clindamycin. States he's had 1 female sexual partner within the past year and is monogamous with his wife. Denies hx of sexually transmitted diseases. Denies hx of genital lesions. States he had one canker sore about 2 months ago. Denies fevers, chills, headaches, visual changes, chest pain, N/V or abdominal pain.     In ED: vitals HR 67, /81, Temp 98F, 98% on RA  CT Neck Soft Tissue shows RIGHT buccal dermal thickening and irregularity consistent with patient's known rash. Underlying subcutaneous edema and thickening of the platysma noted consistent with cellulitis. Minimal edema in the submental triangle and about the RIGHT strap muscles. No drainable   abscess is seen.    s/p Zosyn, Valtrx and Vanco in ED

## 2023-02-23 NOTE — H&P ADULT - ASSESSMENT
52 year old male, from home, no PMH, presents to the ED with right facial swelling and pain. Admitted to medicine for management of cellulitis and disseminated herpes simplex.

## 2023-02-24 LAB
A1C WITH ESTIMATED AVERAGE GLUCOSE RESULT: 5.6 % — SIGNIFICANT CHANGE UP (ref 4–5.6)
ALBUMIN SERPL ELPH-MCNC: 3.4 G/DL — LOW (ref 3.5–5)
ALP SERPL-CCNC: 90 U/L — SIGNIFICANT CHANGE UP (ref 40–120)
ALT FLD-CCNC: 54 U/L DA — SIGNIFICANT CHANGE UP (ref 10–60)
ANION GAP SERPL CALC-SCNC: 6 MMOL/L — SIGNIFICANT CHANGE UP (ref 5–17)
AST SERPL-CCNC: 43 U/L — HIGH (ref 10–40)
BASOPHILS # BLD AUTO: 0 K/UL — SIGNIFICANT CHANGE UP (ref 0–0.2)
BASOPHILS NFR BLD AUTO: 0 % — SIGNIFICANT CHANGE UP (ref 0–2)
BILIRUB SERPL-MCNC: 1 MG/DL — SIGNIFICANT CHANGE UP (ref 0.2–1.2)
BUN SERPL-MCNC: 16 MG/DL — SIGNIFICANT CHANGE UP (ref 7–18)
CALCIUM SERPL-MCNC: 9 MG/DL — SIGNIFICANT CHANGE UP (ref 8.4–10.5)
CHLORIDE SERPL-SCNC: 105 MMOL/L — SIGNIFICANT CHANGE UP (ref 96–108)
CO2 SERPL-SCNC: 26 MMOL/L — SIGNIFICANT CHANGE UP (ref 22–31)
CREAT SERPL-MCNC: 1.05 MG/DL — SIGNIFICANT CHANGE UP (ref 0.5–1.3)
EGFR: 84 ML/MIN/1.73M2 — SIGNIFICANT CHANGE UP
EOSINOPHIL # BLD AUTO: 0.05 K/UL — SIGNIFICANT CHANGE UP (ref 0–0.5)
EOSINOPHIL NFR BLD AUTO: 1 % — SIGNIFICANT CHANGE UP (ref 0–6)
ESTIMATED AVERAGE GLUCOSE: 114 MG/DL — SIGNIFICANT CHANGE UP (ref 68–114)
GLUCOSE SERPL-MCNC: 106 MG/DL — HIGH (ref 70–99)
HCT VFR BLD CALC: 43.8 % — SIGNIFICANT CHANGE UP (ref 39–50)
HGB BLD-MCNC: 14.5 G/DL — SIGNIFICANT CHANGE UP (ref 13–17)
HIV 1+2 AB+HIV1 P24 AG SERPL QL IA: SIGNIFICANT CHANGE UP
LG PLATELETS BLD QL AUTO: SLIGHT — SIGNIFICANT CHANGE UP
LYMPHOCYTES # BLD AUTO: 1.37 K/UL — SIGNIFICANT CHANGE UP (ref 1–3.3)
LYMPHOCYTES # BLD AUTO: 29 % — SIGNIFICANT CHANGE UP (ref 13–44)
MAGNESIUM SERPL-MCNC: 2.3 MG/DL — SIGNIFICANT CHANGE UP (ref 1.6–2.6)
MANUAL SMEAR VERIFICATION: SIGNIFICANT CHANGE UP
MCHC RBC-ENTMCNC: 29.4 PG — SIGNIFICANT CHANGE UP (ref 27–34)
MCHC RBC-ENTMCNC: 33.1 GM/DL — SIGNIFICANT CHANGE UP (ref 32–36)
MCV RBC AUTO: 88.8 FL — SIGNIFICANT CHANGE UP (ref 80–100)
MONOCYTES # BLD AUTO: 0.52 K/UL — SIGNIFICANT CHANGE UP (ref 0–0.9)
MONOCYTES NFR BLD AUTO: 11 % — SIGNIFICANT CHANGE UP (ref 2–14)
NEUTROPHILS # BLD AUTO: 2.21 K/UL — SIGNIFICANT CHANGE UP (ref 1.8–7.4)
NEUTROPHILS NFR BLD AUTO: 47 % — SIGNIFICANT CHANGE UP (ref 43–77)
NRBC # BLD: 0 /100 — SIGNIFICANT CHANGE UP (ref 0–0)
PHOSPHATE SERPL-MCNC: 3.7 MG/DL — SIGNIFICANT CHANGE UP (ref 2.5–4.5)
PLAT MORPH BLD: NORMAL — SIGNIFICANT CHANGE UP
PLATELET # BLD AUTO: 132 K/UL — LOW (ref 150–400)
PLATELET COUNT - ESTIMATE: ABNORMAL
POTASSIUM SERPL-MCNC: 4.1 MMOL/L — SIGNIFICANT CHANGE UP (ref 3.5–5.3)
POTASSIUM SERPL-SCNC: 4.1 MMOL/L — SIGNIFICANT CHANGE UP (ref 3.5–5.3)
PROT SERPL-MCNC: 7.6 G/DL — SIGNIFICANT CHANGE UP (ref 6–8.3)
RBC # BLD: 4.93 M/UL — SIGNIFICANT CHANGE UP (ref 4.2–5.8)
RBC # FLD: 12.9 % — SIGNIFICANT CHANGE UP (ref 10.3–14.5)
RBC BLD AUTO: NORMAL — SIGNIFICANT CHANGE UP
SODIUM SERPL-SCNC: 137 MMOL/L — SIGNIFICANT CHANGE UP (ref 135–145)
VARIANT LYMPHS # BLD: 12 % — HIGH (ref 0–6)
WBC # BLD: 4.71 K/UL — SIGNIFICANT CHANGE UP (ref 3.8–10.5)
WBC # FLD AUTO: 4.71 K/UL — SIGNIFICANT CHANGE UP (ref 3.8–10.5)

## 2023-02-24 PROCEDURE — 99222 1ST HOSP IP/OBS MODERATE 55: CPT

## 2023-02-24 PROCEDURE — 99233 SBSQ HOSP IP/OBS HIGH 50: CPT

## 2023-02-24 RX ADMIN — VALACYCLOVIR 1000 MILLIGRAM(S): 500 TABLET, FILM COATED ORAL at 21:13

## 2023-02-24 RX ADMIN — Medication 100 MILLIGRAM(S): at 13:08

## 2023-02-24 RX ADMIN — Medication 100 MILLIGRAM(S): at 05:01

## 2023-02-24 RX ADMIN — Medication 100 MILLIGRAM(S): at 21:13

## 2023-02-24 RX ADMIN — Medication 30 MILLIGRAM(S): at 06:30

## 2023-02-24 RX ADMIN — GABAPENTIN 100 MILLIGRAM(S): 400 CAPSULE ORAL at 17:33

## 2023-02-24 RX ADMIN — ENOXAPARIN SODIUM 40 MILLIGRAM(S): 100 INJECTION SUBCUTANEOUS at 12:41

## 2023-02-24 RX ADMIN — Medication 60 MILLIGRAM(S): at 17:29

## 2023-02-24 RX ADMIN — TRAMADOL HYDROCHLORIDE 50 MILLIGRAM(S): 50 TABLET ORAL at 14:12

## 2023-02-24 RX ADMIN — TRAMADOL HYDROCHLORIDE 50 MILLIGRAM(S): 50 TABLET ORAL at 15:14

## 2023-02-24 RX ADMIN — VALACYCLOVIR 1000 MILLIGRAM(S): 500 TABLET, FILM COATED ORAL at 05:01

## 2023-02-24 RX ADMIN — Medication 30 MILLIGRAM(S): at 06:00

## 2023-02-24 RX ADMIN — VALACYCLOVIR 1000 MILLIGRAM(S): 500 TABLET, FILM COATED ORAL at 13:08

## 2023-02-24 RX ADMIN — GABAPENTIN 100 MILLIGRAM(S): 400 CAPSULE ORAL at 05:01

## 2023-02-24 NOTE — PATIENT PROFILE ADULT - FALL HARM RISK - UNIVERSAL INTERVENTIONS
Bed in lowest position, wheels locked, appropriate side rails in place/Call bell, personal items and telephone in reach/Instruct patient to call for assistance before getting out of bed or chair/Non-slip footwear when patient is out of bed/Adrian to call system/Physically safe environment - no spills, clutter or unnecessary equipment/Purposeful Proactive Rounding/Room/bathroom lighting operational, light cord in reach

## 2023-02-24 NOTE — CONSULT NOTE ADULT - SUBJECTIVE AND OBJECTIVE BOX
HPI:  52 year old male, from home, no PMH, presents to the ED with right facial swelling and pain. He states that 5 days PTA, he noticed a small pimple on his right cheek which he popped. During the next few days, he began to have increased itchiness, pain and swelling of his right cheek, extending into his right ear lobe. He states he had associated hearing changes in the right ear. Yesterday he noticed new lesions in his scalp, chest and arms. He went to his PCP who prescribed him doxycycline and topical clindamycin. States he's had 1 female sexual partner within the past year and is monogamous with his wife. Denies hx of sexually transmitted diseases. Denies hx of genital lesions. States he had one canker sore about 2 months ago. Denies fevers, chills, headaches, visual changes, chest pain, N/V or abdominal pain.  CT Neck Soft Tissue shows RIGHT buccal dermal thickening and irregularity consistent with patient's known rash. Underlying subcutaneous edema and thickening of the platysma noted consistent with cellulitis. Minimal edema in the submental triangle and about the RIGHT strap muscles. No drainable abscess is seen. Given zosyn and vanco in the ED, then started on valacyclovir and cefazolin.    Additional Hx at the time of the ID consult: does not recall having chicken pox when younger; no travel or known sick contacts. Says he was diagnosed with kidney cancer one yr ago.        PAST MEDICAL & SURGICAL HISTORY:  Obesity    H/O hernia repair    kidney cancer          MEDS:  acetaminophen     Tablet .. 650 milliGRAM(s) Oral every 6 hours PRN  aluminum hydroxide/magnesium hydroxide/simethicone Suspension 30 milliLiter(s) Oral every 4 hours PRN  ceFAZolin   IVPB 2000 milliGRAM(s) IV Intermittent every 8 hours (D2)  enoxaparin Injectable 40 milliGRAM(s) SubCutaneous every 24 hours  gabapentin 100 milliGRAM(s) Oral two times a day  ketorolac   Injectable 30 milliGRAM(s) IV Push every 6 hours PRN  melatonin 3 milliGRAM(s) Oral at bedtime PRN  ondansetron Injectable 4 milliGRAM(s) IV Push every 8 hours PRN  traMADol 50 milliGRAM(s) Oral every 6 hours PRN  valACYclovir 1000 milliGRAM(s) Oral three times a day (D2)      ALLERGIES  No Known Allergies      SOCIAL HISTORY: emigrated from [place] >25y ago;  and lives with family; no cigs, no ETOH; works as a       FAMILY HISTORY:    FH: Alzheimers disease  Father        ROS:    General: no fever or chills	    Skin: see HPI  	  HEENT: see HPI; no vision complaints    Respiratory and Thorax: no cough, no SOB  	  Cardiovascular:	no CP    GI: no N, V, diarrhea	    Genitourinary:	no urinary tract complaints    Musculoskeletal:	 denies arthralgias, myalgias    Neurological:	decreased hearing on R        PHYSICAL EXAM:    Vital Signs Last 24 Hrs  T(C): 36.8 (24 Feb 2023 05:00), Max: 36.9 (24 Feb 2023 00:30)  T(F): 98.3 (24 Feb 2023 05:00), Max: 98.4 (24 Feb 2023 00:30)  HR: 74 (24 Feb 2023 05:00) (67 - 81)  BP: 125/59 (24 Feb 2023 05:00) (116/70 - 131/62)  BP(mean): --  RR: 17 (24 Feb 2023 05:00) (17 - 18)  SpO2: 97% (24 Feb 2023 05:00) (97% - 98%)    Parameters below as of 24 Feb 2023 05:00  Patient On (Oxygen Delivery Method): room air          Gen:    HEENT:    Neck:    Lymph Nodes:    Breasts:    Back:    Chest/Thorax:    Cardiovascular:    Gastrointestinal:    Genitourinary:    Rectal:    Extremities:    Vascular:    Neurological:    Skin:    Psychiatric:    LABS/DIAGNOSTIC TESTS:                          14.5   4.71  )-----------( 132      ( 24 Feb 2023 06:10 )             43.8     WBC Count: 4.71 K/uL (02-24 @ 06:10)  WBC Count: 4.57 K/uL (02-23 @ 10:00)      02-24    137  |  105  |  16  ----------------------------<  106<H>  4.1   |  26  |  1.05    Ca    9.0      24 Feb 2023 06:10  Phos  3.7     02-24  Mg     2.3     02-24    TPro  7.6  /  Alb  3.4<L>  /  TBili  1.0  /  DBili  x   /  AST  43<H>  /  ALT  54  /  AlkPhos  90  02-24          LIVER FUNCTIONS - ( 24 Feb 2023 06:10 )  Alb: 3.4 g/dL / Pro: 7.6 g/dL / ALK PHOS: 90 U/L / ALT: 54 U/L DA / AST: 43 U/L / GGT: x                 LACTATE:    ABG -     CULTURES:       RADIOLOGY               HPI:  52 year old male, from home presents to the ED with right facial swelling and pain. He states that 5 days PTA, he noticed a small pimple on his right cheek which he popped. During the next few days, he began to have increased itchiness, pain and swelling of his right cheek, extending into his right ear. He states he had associated hearing changes in the right ear. On the day PTA, he noticed new lesions in his scalp, chest and arms. He went to his PCP who prescribed him doxycycline and topical clindamycin. States he's had 1 female sexual partner within the past year and is monogamous with his wife. Denies hx of sexually transmitted diseases. Denies hx of genital lesions. States he had one canker sore about 2 months ago. Denies fevers, chills, headaches, visual changes, chest pain, N/V or abdominal pain.  CT Neck Soft Tissue shows RIGHT buccal dermal thickening and irregularity consistent with patient's known rash. Underlying subcutaneous edema and thickening of the platysma noted consistent with cellulitis. Minimal edema in the submental triangle and about the RIGHT strap muscles. No drainable abscess is seen. Given zosyn and vanco in the ED, then started on valacyclovir and cefazolin.    Additional Hx at the time of the ID consult: does not recall having chicken pox when younger; no travel or known sick contacts. States he has developed new lesions since yesterday on head, arms, and abd.         PAST MEDICAL & SURGICAL HISTORY:  Obesity    H/O hernia repair    kidney cyst          MEDS:  acetaminophen     Tablet .. 650 milliGRAM(s) Oral every 6 hours PRN  aluminum hydroxide/magnesium hydroxide/simethicone Suspension 30 milliLiter(s) Oral every 4 hours PRN  ceFAZolin   IVPB 2000 milliGRAM(s) IV Intermittent every 8 hours (D2)  enoxaparin Injectable 40 milliGRAM(s) SubCutaneous every 24 hours  gabapentin 100 milliGRAM(s) Oral two times a day  ketorolac   Injectable 30 milliGRAM(s) IV Push every 6 hours PRN  melatonin 3 milliGRAM(s) Oral at bedtime PRN  ondansetron Injectable 4 milliGRAM(s) IV Push every 8 hours PRN  traMADol 50 milliGRAM(s) Oral every 6 hours PRN  valACYclovir 1000 milliGRAM(s) Oral three times a day (D2)      ALLERGIES  No Known Allergies      SOCIAL HISTORY: emigrated from [place] >25y ago;  and lives with family; no cigs, no ETOH; works as a       FAMILY HISTORY:  Alzheimers disease  Father      ROS:    General: no fever or chills	    Skin: see HPI  	  HEENT: see HPI; no vision complaints    Respiratory and Thorax: no cough, no SOB  	  Cardiovascular:	no CP    GI: no N, V, diarrhea	    Genitourinary:	no urinary tract complaints    Musculoskeletal:	 denies arthralgias, myalgias    Neurological:	decreased hearing on R        PHYSICAL EXAM:    Vital Signs Last 24 Hrs  T(C): 36.8 (24 Feb 2023 05:00), Max: 36.9 (24 Feb 2023 00:30)  T(F): 98.3 (24 Feb 2023 05:00), Max: 98.4 (24 Feb 2023 00:30)  HR: 74 (24 Feb 2023 05:00) (67 - 81)  BP: 125/59 (24 Feb 2023 05:00) (116/70 - 131/62)  BP(mean): --  RR: 17 (24 Feb 2023 05:00) (17 - 18)  SpO2: 97% (24 Feb 2023 05:00) (97% - 98%)    Parameters below as of 24 Feb 2023 05:00  Patient On (Oxygen Delivery Method): room air          Gen: WD obese male in NAD    HEENT: multiple crusted lesions on R side of face, R ear with some vesicles also noted; sparse lesions on R side of head; R earlobe swollen; no conjunctival lesions    Neck: supple; mostly crusted lesions primarily on R side of neck    Lymph Nodes: cd not appreciate any enlarged submand, cervical or supraclav LNs    Back: no CVAT; sparse vesicular lesions on back    Chest/Thorax: multiple lesions that are generally crusted although some vesicular lesions noted on upper chest bilat.    Cardiovascular: S1S2 reg with no murmurs, gallops, rubs    ABD: soft and non-tender with active BS    Genitourinary: no hayden    Extremities:  sparse distribution of vesicles on arms and thighs    Neurological: A+Ox3    Skin: predominance of crusted lesions on R side of face, R ear, and R upper neck; vesicular lesions also noted on neck, including L side, upper chest bilat, upper back bilat, abd, and both arms and legs    Psychiatric: affect appropriate      LABS/DIAGNOSTIC TESTS:                          14.5   4.71  )-----------( 132      ( 24 Feb 2023 06:10 )             43.8     WBC Count: 4.71 K/uL (02-24 @ 06:10)  WBC Count: 4.57 K/uL (02-23 @ 10:00)      02-24    137  |  105  |  16  ----------------------------<  106<H>  4.1   |  26  |  1.05    Ca    9.0      24 Feb 2023 06:10  Phos  3.7     02-24  Mg     2.3     02-24    TPro  7.6  /  Alb  3.4<L>  /  TBili  1.0  /  DBili  x   /  AST  43<H>  /  ALT  54  /  AlkPhos  90  02-24          LIVER FUNCTIONS - ( 24 Feb 2023 06:10 )  Alb: 3.4 g/dL / Pro: 7.6 g/dL / ALK PHOS: 90 U/L / ALT: 54 U/L DA / AST: 43 U/L / GGT: x                 RADIOLOGY:  < from: CT Neck Soft Tissue w/ IV Cont (02.23.23 @ 13:34) >  ACC: [id number] EXAM:  CT NECK SOFT TISSUE IC   ORDERED BY: DENISA QUINTANILLA     PROCEDURE DATE:  02/23/2023          INTERPRETATION:  CT neck without contrast    CLINICAL INFORMATION: RIGHT facial rash/swelling    TECHNIQUE:  Contiguous axial 3 mm thick sections were obtained through   the neck using single helical acquisition.   3 mm sagittal and coronal   reconstructions were obtained.  This scan was performed using automatic   exposure control (radiation dose reduction software) to obtain a   diagnostic image quality scan with patient dose as low as reasonably   achievable.    FINDINGS:   No prior similar studies are available for review.    RIGHT buccal dermal thickening and irregularity consistent with patient's   known rash. Underlying subcutaneous edema and thickening of the platysma   noted consistent with cellulitis. Minimal edema in the submental triangle   and about the RIGHT strap muscles. No drainable abscess is seen.    Reactive cervical lymph nodes are noted, the largest in the LEFT level 1A   region measuring 1.2 x 1.0 cm.  The visualized lymph nodes demonstrate no   central necrosis or extranodal extension, allowing for the noncontrast   technique.    The mucosal surfaces of the upper aerodigestive tract appear symmetric   and unremarkable.  The larynx is intact.  The preepiglottic and   paralaryngeal spaces are intact.  Laryngeal cartilages remain intact.    The nasopharynx is symmetric.  No lateral retropharyngeal mass is found.    The underlying central skull base is intact.  The petrous temporal bones   including mastoid air cells are intact.  The visualized base of brain   appears unremarkable.    The parotid and submandibular glands are intact.  The thyroid gland is   intact.    The visualized paranasal sinuses are clear.  The nasal cavity is   unremarkable.    The cervical spine appears intact.    The lung apices show minimal dependent atelectasis in the posterior upper   lobes., allowing for the for the neck CT technique.      IMPRESSION: RIGHT buccal dermal thickening and irregularity consistent   with patient's known rash. Underlying subcutaneous edema and thickening   of the platysma noted consistent with cellulitis. Minimal edema in the   submental triangle and about the RIGHT strap muscles.No drainable   abscess is seen.                       HPI:  52 year old male, from home presents to the ED with right facial swelling and pain. He states that 5 days PTA, he noticed a small pimple on his right cheek which he popped. During the next few days, he began to have increased itchiness, pain and swelling of his right cheek, extending into his right ear. He states he had associated hearing changes in the right ear. On the day PTA, he noticed new lesions in his scalp, chest and arms. He went to his PCP who prescribed him doxycycline and topical clindamycin. States he's had 1 female sexual partner within the past year and is monogamous with his wife. Denies hx of sexually transmitted diseases. Denies hx of genital lesions. States he had one canker sore about 2 months ago. Denies fevers, chills, headaches, visual changes, chest pain, N/V or abdominal pain.  CT Neck Soft Tissue shows RIGHT buccal dermal thickening and irregularity consistent with patient's known rash. Underlying subcutaneous edema and thickening of the platysma noted consistent with cellulitis. Minimal edema in the submental triangle and about the RIGHT strap muscles. No drainable abscess is seen. Given zosyn and vanco in the ED, then started on valacyclovir and cefazolin.    Additional Hx at the time of the ID consult: does not recall having chicken pox when younger; no travel or known sick contacts. States he has developed new lesions since yesterday on head, arms, and abd.         PAST MEDICAL & SURGICAL HISTORY:  Obesity    H/O hernia repair    kidney cyst          MEDS:  acetaminophen     Tablet .. 650 milliGRAM(s) Oral every 6 hours PRN  aluminum hydroxide/magnesium hydroxide/simethicone Suspension 30 milliLiter(s) Oral every 4 hours PRN  ceFAZolin   IVPB 2000 milliGRAM(s) IV Intermittent every 8 hours (D2)  enoxaparin Injectable 40 milliGRAM(s) SubCutaneous every 24 hours  gabapentin 100 milliGRAM(s) Oral two times a day  ketorolac   Injectable 30 milliGRAM(s) IV Push every 6 hours PRN  melatonin 3 milliGRAM(s) Oral at bedtime PRN  ondansetron Injectable 4 milliGRAM(s) IV Push every 8 hours PRN  traMADol 50 milliGRAM(s) Oral every 6 hours PRN  valACYclovir 1000 milliGRAM(s) Oral three times a day (D2)      ALLERGIES  No Known Allergies      SOCIAL HISTORY: emigrated from [place] >25y ago;  and lives with family; no cigs, no ETOH; works as a       FAMILY HISTORY:  Alzheimers disease  Father      ROS:    General: no fever or chills	    Skin: see HPI  	  HEENT: see HPI; no vision complaints    Respiratory and Thorax: no cough, no SOB  	  Cardiovascular:	no CP    GI: no N, V, diarrhea	    Genitourinary:	no urinary tract complaints    Musculoskeletal:	 denies arthralgias, myalgias    Neurological:	decreased hearing on R        PHYSICAL EXAM:    Vital Signs Last 24 Hrs  T(C): 36.8 (24 Feb 2023 05:00), Max: 36.9 (24 Feb 2023 00:30)  T(F): 98.3 (24 Feb 2023 05:00), Max: 98.4 (24 Feb 2023 00:30)  HR: 74 (24 Feb 2023 05:00) (67 - 81)  BP: 125/59 (24 Feb 2023 05:00) (116/70 - 131/62)  BP(mean): --  RR: 17 (24 Feb 2023 05:00) (17 - 18)  SpO2: 97% (24 Feb 2023 05:00) (97% - 98%)    Parameters below as of 24 Feb 2023 05:00  Patient On (Oxygen Delivery Method): room air      Gen: WD obese male in NAD    HEENT: multiple crusted lesions on R side of face, R ear with some vesicles also noted; sparse lesions on R side of head; R earlobe swollen; no conjunctival lesions    Neck: supple; mostly crusted lesions primarily on R side of neck    Lymph Nodes: cd not appreciate any enlarged submand, cervical or supraclav LNs    Back: no CVAT; sparse vesicular lesions on back    Chest/Thorax: multiple lesions that are generally crusted although some vesicular lesions noted on upper chest bilat.    Cardiovascular: S1S2 reg with no murmurs, gallops, rubs    ABD: soft and non-tender with active BS    Genitourinary: no hayden    Extremities:  sparse distribution of vesicles on arms and thighs    Neurological: A+Ox3; Cr n grossly intact (including VII)    Skin: predominance of crusted lesions on R side of face, R ear, and R upper neck; vesicular lesions also noted on neck, including L side, upper chest bilat, upper back bilat, abd, and both arms and legs    Psychiatric: affect appropriate      LABS/DIAGNOSTIC TESTS:                          14.5   4.71  )-----------( 132      ( 24 Feb 2023 06:10 )             43.8     WBC Count: 4.71 K/uL (02-24 @ 06:10)  WBC Count: 4.57 K/uL (02-23 @ 10:00)      02-24    137  |  105  |  16  ----------------------------<  106<H>  4.1   |  26  |  1.05    Ca    9.0      24 Feb 2023 06:10  Phos  3.7     02-24  Mg     2.3     02-24    TPro  7.6  /  Alb  3.4<L>  /  TBili  1.0  /  DBili  x   /  AST  43<H>  /  ALT  54  /  AlkPhos  90  02-24          LIVER FUNCTIONS - ( 24 Feb 2023 06:10 )  Alb: 3.4 g/dL / Pro: 7.6 g/dL / ALK PHOS: 90 U/L / ALT: 54 U/L DA / AST: 43 U/L / GGT: x                 RADIOLOGY:  < from: CT Neck Soft Tissue w/ IV Cont (02.23.23 @ 13:34) >  ACC: [id number] EXAM:  CT NECK SOFT TISSUE IC   ORDERED BY: DENISA QUINTANILLA     PROCEDURE DATE:  02/23/2023          INTERPRETATION:  CT neck without contrast    CLINICAL INFORMATION: RIGHT facial rash/swelling    TECHNIQUE:  Contiguous axial 3 mm thick sections were obtained through   the neck using single helical acquisition.   3 mm sagittal and coronal   reconstructions were obtained.  This scan was performed using automatic   exposure control (radiation dose reduction software) to obtain a   diagnostic image quality scan with patient dose as low as reasonably   achievable.    FINDINGS:   No prior similar studies are available for review.    RIGHT buccal dermal thickening and irregularity consistent with patient's   known rash. Underlying subcutaneous edema and thickening of the platysma   noted consistent with cellulitis. Minimal edema in the submental triangle   and about the RIGHT strap muscles. No drainable abscess is seen.    Reactive cervical lymph nodes are noted, the largest in the LEFT level 1A   region measuring 1.2 x 1.0 cm.  The visualized lymph nodes demonstrate no   central necrosis or extranodal extension, allowing for the noncontrast   technique.    The mucosal surfaces of the upper aerodigestive tract appear symmetric   and unremarkable.  The larynx is intact.  The preepiglottic and   paralaryngeal spaces are intact.  Laryngeal cartilages remain intact.    The nasopharynx is symmetric.  No lateral retropharyngeal mass is found.    The underlying central skull base is intact.  The petrous temporal bones   including mastoid air cells are intact.  The visualized base of brain   appears unremarkable.    The parotid and submandibular glands are intact.  The thyroid gland is   intact.    The visualized paranasal sinuses are clear.  The nasal cavity is   unremarkable.    The cervical spine appears intact.    The lung apices show minimal dependent atelectasis in the posterior upper   lobes., allowing for the for the neck CT technique.      IMPRESSION: RIGHT buccal dermal thickening and irregularity consistent   with patient's known rash. Underlying subcutaneous edema and thickening   of the platysma noted consistent with cellulitis. Minimal edema in the   submental triangle and about the RIGHT strap muscles.No drainable   abscess is seen.

## 2023-02-24 NOTE — PROGRESS NOTE ADULT - ASSESSMENT
52 year old male, from home, no PMH, presents to the ED with right facial swelling, pain, and R ear swelling. Admitted to medicine for cellulitis and disseminated herpes simplex, CT neck/soft tissue showed Cellulitis, negative for abscess.  52 year old male, from home, no PMH, presents to the ED with right facial swelling, pain, and R ear swelling. Admitted to medicine for cellulitis and disseminated herpes simplex, started on Valtrex and IV Cefazolin. CT neck/soft tissue showed Cellulitis, negative for abscess. ID Dr. Diaz consulted.

## 2023-02-24 NOTE — PROGRESS NOTE ADULT - PROBLEM SELECTOR PLAN 1
- p/w scattered vesicles, mainly on face, present on chest, scalp and arm  - continue Valtrex 1000mg TID for HSV  - continue Cefazolin for cellulitus/soft tissue infection  - continue gabapentin 100mg BID for neuropathic pain  - f/u HIV test  - monitor for ocular or neurological symptoms  - ID *_*_*_*_* - p/w scattered vesicles, mainly on face, present on chest, scalp and arm  - continue Valtrex 1000mg TID for HSV  - continue Cefazolin for cellulitus/soft tissue infection  - continue gabapentin 100mg BID for neuropathic pain  - f/u HIV test  - monitor for ocular or neurological symptoms  - ID Dr. Diaz consulted

## 2023-02-24 NOTE — PROGRESS NOTE ADULT - SUBJECTIVE AND OBJECTIVE BOX
NP Note discussed with  Primary Attending    Patient is a 52y old  Male who presents with a chief complaint of cellulitis (23 Feb 2023 16:46)      INTERVAL HPI/OVERNIGHT EVENTS: no acute events dinner    MEDICATIONS  (STANDING):  ceFAZolin   IVPB 2000 milliGRAM(s) IV Intermittent every 8 hours  enoxaparin Injectable 40 milliGRAM(s) SubCutaneous every 24 hours  gabapentin 100 milliGRAM(s) Oral two times a day  valACYclovir 1000 milliGRAM(s) Oral three times a day    MEDICATIONS  (PRN):  acetaminophen     Tablet .. 650 milliGRAM(s) Oral every 6 hours PRN Temp greater or equal to 38C (100.4F), Mild Pain (1 - 3)  aluminum hydroxide/magnesium hydroxide/simethicone Suspension 30 milliLiter(s) Oral every 4 hours PRN Dyspepsia  ketorolac   Injectable 30 milliGRAM(s) IV Push every 6 hours PRN Severe Pain (7 - 10)  melatonin 3 milliGRAM(s) Oral at bedtime PRN Insomnia  ondansetron Injectable 4 milliGRAM(s) IV Push every 8 hours PRN Nausea and/or Vomiting  traMADol 50 milliGRAM(s) Oral every 6 hours PRN Moderate Pain (4 - 6)      __________________________________________________  REVIEW OF SYSTEMS:    CONSTITUTIONAL: No fever,   EYES: no acute visual disturbances  EARS: R ear swelling  RESPIRATORY: No cough; No shortness of breath  CARDIOVASCULAR: No chest pain, no palpitations  GASTROINTESTINAL: No pain. No nausea or vomiting; No diarrhea   NEUROLOGICAL: No headache or numbness, no tremors  MUSCULOSKELETAL: No joint pain, no muscle pain  GENITOURINARY: no dysuria, no frequency, no hesitancy  INTEGUMENTARY: new small "pimples" to face and leg  PSYCHIATRY: no depression , no anxiety  ALL OTHER  ROS negative        Vital Signs Last 24 Hrs  T(C): 36.8 (24 Feb 2023 05:00), Max: 36.9 (24 Feb 2023 00:30)  T(F): 98.3 (24 Feb 2023 05:00), Max: 98.4 (24 Feb 2023 00:30)  HR: 74 (24 Feb 2023 05:00) (67 - 81)  BP: 125/59 (24 Feb 2023 05:00) (116/70 - 131/62)  BP(mean): --  RR: 17 (24 Feb 2023 05:00) (17 - 18)  SpO2: 97% (24 Feb 2023 05:00) (97% - 98%)    Parameters below as of 24 Feb 2023 05:00  Patient On (Oxygen Delivery Method): room air        ________________________________________________  PHYSICAL EXAM:  GENERAL: NAD  HEENT: Normocephalic;  conjunctivae and sclerae clear  NECK : supple  CHEST/LUNG: Clear to auscultation bilaterally  HEART: S1 S2  RRR  ABDOMEN: Soft, Nontender, Nondistended; Bowel sounds present  EXTREMITIES: no cyanosis; no edema  SKIN: + rash to Right jaw line, anterior chest wall,   NERVOUS SYSTEM:  Awake and alert; Oriented  to place, person and time    _________________________________________________  LABS:                        14.5   4.71  )-----------( 132      ( 24 Feb 2023 06:10 )             43.8     02-24    137  |  105  |  16  ----------------------------<  106<H>  4.1   |  26  |  1.05    Ca    9.0      24 Feb 2023 06:10  Phos  3.7     02-24  Mg     2.3     02-24    TPro  7.6  /  Alb  3.4<L>  /  TBili  1.0  /  DBili  x   /  AST  43<H>  /  ALT  54  /  AlkPhos  90  02-24        CAPILLARY BLOOD GLUCOSE            RADIOLOGY & ADDITIONAL TESTS:  < from: CT Neck Soft Tissue w/ IV Cont (02.23.23 @ 13:34) >  IMPRESSION: RIGHT buccal dermal thickening and irregularity consistent   with patient's known rash. Underlying subcutaneous edema and thickening   of the platysma noted consistent with cellulitis. Minimal edema in the   submental triangle and about the RIGHT strap muscles.No drainable   abscess is seen.    < end of copied text >        Imaging Personally Reviewed:  YES    Consultant(s) Notes Reviewed:   YES      Plan of care was discussed with patient and /or primary care giver; all questions and concerns were addressed and care was aligned with patient's wishes.     NP Note discussed with  Primary Attending    Patient is a 52y old  Male who presents with a chief complaint of cellulitis (23 Feb 2023 16:46)      INTERVAL HPI/OVERNIGHT EVENTS: no acute events overnight    MEDICATIONS  (STANDING):  ceFAZolin   IVPB 2000 milliGRAM(s) IV Intermittent every 8 hours  enoxaparin Injectable 40 milliGRAM(s) SubCutaneous every 24 hours  gabapentin 100 milliGRAM(s) Oral two times a day  valACYclovir 1000 milliGRAM(s) Oral three times a day    MEDICATIONS  (PRN):  acetaminophen     Tablet .. 650 milliGRAM(s) Oral every 6 hours PRN Temp greater or equal to 38C (100.4F), Mild Pain (1 - 3)  aluminum hydroxide/magnesium hydroxide/simethicone Suspension 30 milliLiter(s) Oral every 4 hours PRN Dyspepsia  ketorolac   Injectable 30 milliGRAM(s) IV Push every 6 hours PRN Severe Pain (7 - 10)  melatonin 3 milliGRAM(s) Oral at bedtime PRN Insomnia  ondansetron Injectable 4 milliGRAM(s) IV Push every 8 hours PRN Nausea and/or Vomiting  traMADol 50 milliGRAM(s) Oral every 6 hours PRN Moderate Pain (4 - 6)      __________________________________________________  REVIEW OF SYSTEMS:    CONSTITUTIONAL: No fever,   EYES: no acute visual disturbances  EARS: R ear swelling  RESPIRATORY: No cough; No shortness of breath  CARDIOVASCULAR: No chest pain, no palpitations  GASTROINTESTINAL: No pain. No nausea or vomiting; No diarrhea   NEUROLOGICAL: No headache or numbness, no tremors  MUSCULOSKELETAL: No joint pain, no muscle pain  GENITOURINARY: no dysuria, no frequency, no hesitancy  INTEGUMENTARY: + new small "pimples" to face and leg  PSYCHIATRY: no depression , no anxiety  ALL OTHER  ROS negative        Vital Signs Last 24 Hrs  T(C): 36.8 (24 Feb 2023 05:00), Max: 36.9 (24 Feb 2023 00:30)  T(F): 98.3 (24 Feb 2023 05:00), Max: 98.4 (24 Feb 2023 00:30)  HR: 74 (24 Feb 2023 05:00) (67 - 81)  BP: 125/59 (24 Feb 2023 05:00) (116/70 - 131/62)  BP(mean): --  RR: 17 (24 Feb 2023 05:00) (17 - 18)  SpO2: 97% (24 Feb 2023 05:00) (97% - 98%)    Parameters below as of 24 Feb 2023 05:00  Patient On (Oxygen Delivery Method): room air        ________________________________________________  PHYSICAL EXAM:  GENERAL: NAD  HEENT: Normocephalic;  conjunctivae and sclerae clear  NECK : supple  CHEST/LUNG: Clear to auscultation bilaterally  HEART: S1 S2  RRR  ABDOMEN: Soft, Nontender, Nondistended; Bowel sounds present  EXTREMITIES: no cyanosis; no edema  SKIN: + rash to Right jaw line, anterior chest wall, R EAR lobe swelling and erythema  NERVOUS SYSTEM:  Awake and alert; Oriented  to place, person and time    _________________________________________________  LABS:                        14.5   4.71  )-----------( 132      ( 24 Feb 2023 06:10 )             43.8     02-24    137  |  105  |  16  ----------------------------<  106<H>  4.1   |  26  |  1.05    Ca    9.0      24 Feb 2023 06:10  Phos  3.7     02-24  Mg     2.3     02-24    TPro  7.6  /  Alb  3.4<L>  /  TBili  1.0  /  DBili  x   /  AST  43<H>  /  ALT  54  /  AlkPhos  90  02-24        CAPILLARY BLOOD GLUCOSE            RADIOLOGY & ADDITIONAL TESTS:  < from: CT Neck Soft Tissue w/ IV Cont (02.23.23 @ 13:34) >  IMPRESSION: RIGHT buccal dermal thickening and irregularity consistent   with patient's known rash. Underlying subcutaneous edema and thickening   of the platysma noted consistent with cellulitis. Minimal edema in the   submental triangle and about the RIGHT strap muscles.No drainable   abscess is seen.    < end of copied text >        Imaging Personally Reviewed:  YES    Consultant(s) Notes Reviewed:   YES      Plan of care was discussed with patient and /or primary care giver; all questions and concerns were addressed and care was aligned with patient's wishes.

## 2023-02-24 NOTE — PROGRESS NOTE ADULT - PROBLEM SELECTOR PLAN 2
- CT Neck Soft Tissue shows RIGHT buccal dermal thickening and irregularity consistent with patient's known rash. Underlying subcutaneous edema and thickening of the platysma noted consistent with cellulitis. Minimal edema in the submental triangle and about the RIGHT strap muscles. No drainable abscess is seen.  - continue Cefazolin  - f/u blood cx

## 2023-02-24 NOTE — CONSULT NOTE ADULT - ASSESSMENT
52 year old male presents to the ED with right facial and ear swelling and pain. 5 days PTA, he noticed a small pimple on his right cheek which he popped. Rash started on R side of face, ear and neck and has since spread to the upper chest, back, abd, both arms and legs. Lesions are at various stages of evolution including vesicles and crusting c/w disseminated zoster vs varicella. 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     Recommend:  --cont. valacyclovir  --d/c cefazolin and Rx R earlobe cellulitis with keflex  --send vesicular fluid for Cx  --start steroids 52 year old male presents to the ED with right facial and ear swelling and pain. 5 days PTA, he noticed a small pimple on his right cheek which he popped. Rash started on R side of face, ear and neck and has since spread to the upper chest, back, abd, both arms and legs. Lesions at various stages of evolution --vesicles and crusted-- and involving multiple dermatomes c/w disseminated zoster Dx (vs varicella). Pt has no known underlying immunosuppressive disorder. Involvement of R side of face/ear with otalgia but without Cr N VII paralysis not c/w Dave Early syndrome.     Recommend:  --cont. valacyclovir  --d/c cefazolin and Rx R earlobe cellulitis with keflex  --send vesicular fluid for viral Cx  --consider steroids  --airborne isolation until all lesions crust  --VZV IgM and IgG    Discussed above with medical team, including Dr. Beltrán.

## 2023-02-24 NOTE — PROGRESS NOTE ADULT - NS ATTEND AMEND GEN_ALL_CORE FT
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 ID consulted, Dr. iDaz, vesicular eruptions in multiple dermatomes from disseminated zoster. Continue on airborne and contact isolation. Continue valcyclovir and cefazolin for cellulitis. 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 HIV pending.

## 2023-02-25 ENCOUNTER — TRANSCRIPTION ENCOUNTER (OUTPATIENT)
Age: 53
End: 2023-02-25

## 2023-02-25 LAB
ALBUMIN SERPL ELPH-MCNC: 3.6 G/DL — SIGNIFICANT CHANGE UP (ref 3.5–5)
ALP SERPL-CCNC: 101 U/L — SIGNIFICANT CHANGE UP (ref 40–120)
ALT FLD-CCNC: 52 U/L DA — SIGNIFICANT CHANGE UP (ref 10–60)
ANION GAP SERPL CALC-SCNC: 6 MMOL/L — SIGNIFICANT CHANGE UP (ref 5–17)
AST SERPL-CCNC: 44 U/L — HIGH (ref 10–40)
BILIRUB SERPL-MCNC: 0.7 MG/DL — SIGNIFICANT CHANGE UP (ref 0.2–1.2)
BUN SERPL-MCNC: 17 MG/DL — SIGNIFICANT CHANGE UP (ref 7–18)
CALCIUM SERPL-MCNC: 9.3 MG/DL — SIGNIFICANT CHANGE UP (ref 8.4–10.5)
CHLORIDE SERPL-SCNC: 104 MMOL/L — SIGNIFICANT CHANGE UP (ref 96–108)
CO2 SERPL-SCNC: 23 MMOL/L — SIGNIFICANT CHANGE UP (ref 22–31)
CREAT SERPL-MCNC: 1 MG/DL — SIGNIFICANT CHANGE UP (ref 0.5–1.3)
EGFR: 89 ML/MIN/1.73M2 — SIGNIFICANT CHANGE UP
GLUCOSE SERPL-MCNC: 158 MG/DL — HIGH (ref 70–99)
HCT VFR BLD CALC: 44.5 % — SIGNIFICANT CHANGE UP (ref 39–50)
HGB BLD-MCNC: 14.9 G/DL — SIGNIFICANT CHANGE UP (ref 13–17)
MAGNESIUM SERPL-MCNC: 2.4 MG/DL — SIGNIFICANT CHANGE UP (ref 1.6–2.6)
MCHC RBC-ENTMCNC: 29.9 PG — SIGNIFICANT CHANGE UP (ref 27–34)
MCHC RBC-ENTMCNC: 33.5 GM/DL — SIGNIFICANT CHANGE UP (ref 32–36)
MCV RBC AUTO: 89.2 FL — SIGNIFICANT CHANGE UP (ref 80–100)
MRSA PCR RESULT.: SIGNIFICANT CHANGE UP
NRBC # BLD: 0 /100 WBCS — SIGNIFICANT CHANGE UP (ref 0–0)
PHOSPHATE SERPL-MCNC: 3.7 MG/DL — SIGNIFICANT CHANGE UP (ref 2.5–4.5)
PLATELET # BLD AUTO: 159 K/UL — SIGNIFICANT CHANGE UP (ref 150–400)
POTASSIUM SERPL-MCNC: 3.9 MMOL/L — SIGNIFICANT CHANGE UP (ref 3.5–5.3)
POTASSIUM SERPL-SCNC: 3.9 MMOL/L — SIGNIFICANT CHANGE UP (ref 3.5–5.3)
PROT SERPL-MCNC: 8.6 G/DL — HIGH (ref 6–8.3)
RBC # BLD: 4.99 M/UL — SIGNIFICANT CHANGE UP (ref 4.2–5.8)
RBC # FLD: 12.6 % — SIGNIFICANT CHANGE UP (ref 10.3–14.5)
S AUREUS DNA NOSE QL NAA+PROBE: DETECTED
SODIUM SERPL-SCNC: 133 MMOL/L — LOW (ref 135–145)
WBC # BLD: 6.15 K/UL — SIGNIFICANT CHANGE UP (ref 3.8–10.5)
WBC # FLD AUTO: 6.15 K/UL — SIGNIFICANT CHANGE UP (ref 3.8–10.5)

## 2023-02-25 PROCEDURE — 99232 SBSQ HOSP IP/OBS MODERATE 35: CPT

## 2023-02-25 PROCEDURE — 99231 SBSQ HOSP IP/OBS SF/LOW 25: CPT

## 2023-02-25 RX ORDER — CEPHALEXIN 500 MG
500 CAPSULE ORAL
Refills: 0 | Status: DISCONTINUED | OUTPATIENT
Start: 2023-02-25 | End: 2023-02-26

## 2023-02-25 RX ADMIN — GABAPENTIN 100 MILLIGRAM(S): 400 CAPSULE ORAL at 18:11

## 2023-02-25 RX ADMIN — GABAPENTIN 100 MILLIGRAM(S): 400 CAPSULE ORAL at 05:34

## 2023-02-25 RX ADMIN — Medication 500 MILLIGRAM(S): at 18:11

## 2023-02-25 RX ADMIN — VALACYCLOVIR 1000 MILLIGRAM(S): 500 TABLET, FILM COATED ORAL at 22:44

## 2023-02-25 RX ADMIN — VALACYCLOVIR 1000 MILLIGRAM(S): 500 TABLET, FILM COATED ORAL at 05:35

## 2023-02-25 RX ADMIN — VALACYCLOVIR 1000 MILLIGRAM(S): 500 TABLET, FILM COATED ORAL at 14:55

## 2023-02-25 RX ADMIN — TRAMADOL HYDROCHLORIDE 50 MILLIGRAM(S): 50 TABLET ORAL at 05:42

## 2023-02-25 RX ADMIN — Medication 500 MILLIGRAM(S): at 12:58

## 2023-02-25 RX ADMIN — TRAMADOL HYDROCHLORIDE 50 MILLIGRAM(S): 50 TABLET ORAL at 06:12

## 2023-02-25 RX ADMIN — Medication 60 MILLIGRAM(S): at 18:11

## 2023-02-25 RX ADMIN — Medication 100 MILLIGRAM(S): at 05:34

## 2023-02-25 RX ADMIN — Medication 500 MILLIGRAM(S): at 23:32

## 2023-02-25 RX ADMIN — ENOXAPARIN SODIUM 40 MILLIGRAM(S): 100 INJECTION SUBCUTANEOUS at 12:58

## 2023-02-25 NOTE — DISCHARGE NOTE PROVIDER - NSDCCPGOAL_GEN_ALL_CORE_FT
Alert and oriented x4. Denies SOB. Tolerating regular diet. Denies nausea and vomiting. Active bowel sounds x4q. Assist of 1 with GB/W. Back and hip pain was managed with PRN oxycodone.      Pt was discharged on 10/25/22. Pt was medically stable upon discharge. AVS, including medications instructions were reviewed with pt. Pt verbally admitted to understanding the instructions. She was accompanied to home by her spouse.    To get better and follow your care plan as instructed.

## 2023-02-25 NOTE — DISCHARGE NOTE PROVIDER - NSDCCPCAREPLAN_GEN_ALL_CORE_FT
PRINCIPAL DISCHARGE DIAGNOSIS  Diagnosis: Disseminated herpes simplex  Assessment and Plan of Treatment: Shingles is a viral infection that causes a painful rash. Shingles is caused by the varicella-zoster virus. This is the same virus that causes chickenpox. The virus stays in your body after you have chickenpox, without causing any symptoms. Shingles occurs when the virus becomes active again. The active virus travels along a nerve to your skin and causes a rash. The rash usually lasts 2 to 3 weeks.  Call your local emergency number (911 in the ) if:  You have trouble moving your arms, legs, or face.  You become confused, or have trouble speaking.  You have a seizure.  Medicines: You may need any of the following:  Antiviral medicine fights the virus causing your shingles.   Self-care:  Apply a cool, wet compress or take a cool bath. This may help decrease itching and pain.  Keep your rash clean and dry. You are contagious until your blisters scab over. Stay away from people who have not had chickenpox or the chickenpox vaccine. Avoid pregnant women, newborns, and people with weak immune systems. They have a higher risk of infection.  Wash your hands often.   Prevent shingles or another shingles outbreak:  A vaccine may be given to help prevent shingles. You can get the vaccine even if you already had shingles. The vaccine comes in 2 forms. A 2-dose vaccine is usually given to adults 50 years or older. A 1-dose vaccine may be given to adults 60 years or older.  The vaccine can help prevent a future outbreak.   Follow up with your doctor      SECONDARY DISCHARGE DIAGNOSES  Diagnosis: Cellulitis  Assessment and Plan of Treatment: Take all of your antibiotics as ordered.  Call your Health Care Provider within two days of arriving home to make a follow up appointment within one week.  If the affected cellulitic area increases in redness, warmth, pain or swelling call your Health Care Provider.  If you develop fever, chills, and/or malaise, call your Health Care Provider.       PRINCIPAL DISCHARGE DIAGNOSIS  Diagnosis: Disseminated herpes simplex  Assessment and Plan of Treatment: Shingles is a viral infection that causes a painful rash. Shingles is caused by the varicella-zoster virus. This is the same virus that causes chickenpox. The virus stays in your body after you have chickenpox, without causing any symptoms. Shingles occurs when the virus becomes active again. The active virus travels along a nerve to your skin and causes a rash. The rash usually lasts 2 to 3 weeks.  Call your local emergency number (911 in the ) if:  You have trouble moving your arms, legs, or face.  You become confused, or have trouble speaking.  You have a seizure.  Medicines: You may need any of the following:  Antiviral medicine fights the virus causing your shingles.   Self-care:  Apply a cool, wet compress or take a cool bath. This may help decrease itching and pain.  Keep your rash clean and dry. You are contagious until your blisters scab over. Stay away from people who have not had chickenpox or the chickenpox vaccine. Avoid pregnant women, newborns, and people with weak immune systems. They have a higher risk of infection.  Wash your hands often.   Prevent shingles or another shingles outbreak:  A vaccine may be given to help prevent shingles. You can get the vaccine even if you already had shingles. The vaccine comes in 2 forms. A 2-dose vaccine is usually given to adults 50 years or older. A 1-dose vaccine may be given to adults 60 years or older.  The vaccine can help prevent a future outbreak.   Follow up with your doctor      SECONDARY DISCHARGE DIAGNOSES  Diagnosis: Cellulitis  Assessment and Plan of Treatment: Cellulitis is a soft tissue infection of the skin. YOu were treated with IV antibiotics while in the hospital. please take all of your antibiotics as ordered. Call your Health Care Provider within two days of arriving home to make a follow up appointment within one week.  If the affected cellulitic area increases in redness, warmth, pain or swelling call your Health Care Provider.  If you develop fever, chills, and/or malaise, call your Health Care Provider.

## 2023-02-25 NOTE — PROGRESS NOTE ADULT - SUBJECTIVE AND OBJECTIVE BOX
Subjective/Objective:      MEDS  acetaminophen     Tablet .. 650 milliGRAM(s) Oral every 6 hours PRN  aluminum hydroxide/magnesium hydroxide/simethicone Suspension 30 milliLiter(s) Oral every 4 hours PRN  ceFAZolin   IVPB 2000 milliGRAM(s) IV Intermittent every 8 hours (D3)  enoxaparin Injectable 40 milliGRAM(s) SubCutaneous every 24 hours  gabapentin 100 milliGRAM(s) Oral two times a day  ketorolac   Injectable 30 milliGRAM(s) IV Push every 6 hours PRN  melatonin 3 milliGRAM(s) Oral at bedtime PRN  ondansetron Injectable 4 milliGRAM(s) IV Push every 8 hours PRN  predniSONE   Tablet 60 milliGRAM(s) Oral every 24 hours  traMADol 50 milliGRAM(s) Oral every 6 hours PRN  valACYclovir 1000 milliGRAM(s) Oral three times a day (D3)      PHYSICAL EXAM:    Vital Signs Last 24 Hrs  T(C): 36.4 (25 Feb 2023 05:12), Max: 37 (24 Feb 2023 20:20)  T(F): 97.6 (25 Feb 2023 05:12), Max: 98.6 (24 Feb 2023 20:20)  HR: 72 (25 Feb 2023 05:12) (65 - 79)  BP: 123/69 (25 Feb 2023 05:12) (123/69 - 133/79)  BP(mean): --  RR: 18 (25 Feb 2023 05:12) (16 - 18)  SpO2: 96% (25 Feb 2023 05:12) (96% - 98%)    Parameters below as of 25 Feb 2023 05:12  Patient On (Oxygen Delivery Method): room air        GEN:    HEENT:    CHEST/Respiratory:    Cardiovascular:    Abdomen:    Genitourinary:    Extremities:     Neurological:    Skin:      LABS/DIAGNOSTIC TESTS                            14.9   6.15  )-----------( 159      ( 25 Feb 2023 06:35 )             44.5       WBC Count: 6.15 K/uL (02-25 @ 06:35)  WBC Count: 4.71 K/uL (02-24 @ 06:10)  WBC Count: 4.57 K/uL (02-23 @ 10:00)      02-25    133<L>  |  104  |  17  ----------------------------<  158<H>  3.9   |  23  |  1.00    Ca    9.3      25 Feb 2023 06:35  Phos  3.7     02-25  Mg     2.4     02-25    TPro  8.6<H>  /  Alb  3.6  /  TBili  0.7  /  DBili  x   /  AST  44<H>  /  ALT  52  /  AlkPhos  101  02-25    HIV-1/2 Antigen/Antibody Screen by [facility] (02.24.23 @ 06:10)   HIV-1/2 Combo Result: Nonreact        CULTURES    Culture - Blood (collected 02-23-23 @ 10:00)  Source: .Blood Blood-Peripheral  Preliminary Report (02-24-23 @ 14:02):    No growth to date.    Culture - Blood (collected 02-23-23 @ 10:00)  Source: .Blood Blood-Peripheral  Preliminary Report (02-24-23 @ 14:02):    No growth to date.                         Subjective/Objective: Pt says pain on R side of face and ear decreased; still with some difficulty hearing; not aware of any additional lesions since yesterday. On steroids, valacyclovir, and cefazolin.      MEDS  acetaminophen     Tablet .. 650 milliGRAM(s) Oral every 6 hours PRN  aluminum hydroxide/magnesium hydroxide/simethicone Suspension 30 milliLiter(s) Oral every 4 hours PRN  ceFAZolin   IVPB 2000 milliGRAM(s) IV Intermittent every 8 hours (D3)  enoxaparin Injectable 40 milliGRAM(s) SubCutaneous every 24 hours  gabapentin 100 milliGRAM(s) Oral two times a day  ketorolac   Injectable 30 milliGRAM(s) IV Push every 6 hours PRN  melatonin 3 milliGRAM(s) Oral at bedtime PRN  ondansetron Injectable 4 milliGRAM(s) IV Push every 8 hours PRN  predniSONE   Tablet 60 milliGRAM(s) Oral every 24 hours  traMADol 50 milliGRAM(s) Oral every 6 hours PRN  valACYclovir 1000 milliGRAM(s) Oral three times a day (D3)      PHYSICAL EXAM:    Vital Signs Last 24 Hrs  T(C): 36.4 (25 Feb 2023 05:12), Max: 37 (24 Feb 2023 20:20)  T(F): 97.6 (25 Feb 2023 05:12), Max: 98.6 (24 Feb 2023 20:20)  HR: 72 (25 Feb 2023 05:12) (65 - 79)  BP: 123/69 (25 Feb 2023 05:12) (123/69 - 133/79)  BP(mean): --  RR: 18 (25 Feb 2023 05:12) (16 - 18)  SpO2: 96% (25 Feb 2023 05:12) (96% - 98%)    Parameters below as of 25 Feb 2023 05:12  Patient On (Oxygen Delivery Method): room air      Gen: WD obese male in NAD    HEENT: multiple crusted lesions on R side of face, R ear with some vesicles also noted; sparse lesions on R side of head; R earlobe swelling decreased; no conjunctival lesions    Neck: supple; mostly crusted lesions primarily on R side of neck    Back: no CVAT; sparse distribution of lesions on back now crusting    Chest/Thorax: multiple lesions that are generally crusted     Cardiovascular: S1S2 reg with no murmurs, gallops, rubs    ABD: soft and non-tender with active BS    Genitourinary: no hayden    Extremities:  sparse distribution of vesicles on arms and thighs are crusting    Neurological: A+Ox3; Cr n grossly intact (including VII)    Skin: predominance of crusted lesions on R side of face, R ear, and R upper neck; vesicular lesions also noted on neck, including L side, upper chest bilat, upper back bilat, abd, and both arms and legs appear to be crusting; no new vesicles        LABS/DIAGNOSTIC TESTS                            14.9   6.15  )-----------( 159      ( 25 Feb 2023 06:35 )             44.5       WBC Count: 6.15 K/uL (02-25 @ 06:35)  WBC Count: 4.71 K/uL (02-24 @ 06:10)  WBC Count: 4.57 K/uL (02-23 @ 10:00)      02-25    133<L>  |  104  |  17  ----------------------------<  158<H>  3.9   |  23  |  1.00    Ca    9.3      25 Feb 2023 06:35  Phos  3.7     02-25  Mg     2.4     02-25    TPro  8.6<H>  /  Alb  3.6  /  TBili  0.7  /  DBili  x   /  AST  44<H>  /  ALT  52  /  AlkPhos  101  02-25    HIV-1/2 Antigen/Antibody Screen by [facility] (02.24.23 @ 06:10)   HIV-1/2 Combo Result: Nonreact        CULTURES    Culture - Blood (collected 02-23-23 @ 10:00)  Source: .Blood Blood-Peripheral  Preliminary Report (02-24-23 @ 14:02):    No growth to date.    Culture - Blood (collected 02-23-23 @ 10:00)  Source: .Blood Blood-Peripheral  Preliminary Report (02-24-23 @ 14:02):    No growth to date.

## 2023-02-25 NOTE — PROGRESS NOTE ADULT - SUBJECTIVE AND OBJECTIVE BOX
S: No acute overnight events. Patient still reports decreased hearing on right ear. Reports improvement in facial pain.    O:  Vital Signs Last 24 Hrs  T(C): 36.6 (25 Feb 2023 13:10), Max: 37 (24 Feb 2023 20:20)  T(F): 97.9 (25 Feb 2023 13:10), Max: 98.6 (24 Feb 2023 20:20)  HR: 69 (25 Feb 2023 13:10) (65 - 72)  BP: 107/60 (25 Feb 2023 13:10) (107/60 - 129/67)  BP(mean): --  RR: 18 (25 Feb 2023 13:10) (18 - 18)  SpO2: 97% (25 Feb 2023 13:10) (96% - 98%)    Parameters below as of 25 Feb 2023 13:10  Patient On (Oxygen Delivery Method): room air        GENERAL: NAD, well-developed  HEAD:  Atraumatic, Normocephalic, scabbed lesions on face, scalp, right ear  EYES: EOMI, PERRLA, conjunctiva and sclera clear  NECK: Supple, No JVD  CHEST/LUNG: Clear to auscultation bilaterally; No wheeze, lesions on chest that are starting to crust over  HEART: Regular rate and rhythm; No murmurs, rubs, or gallops  ABDOMEN: Soft, Nontender, Nondistended; Bowel sounds present  EXTREMITIES:  2+ Peripheral Pulses, No clubbing, cyanosis, or edema  PSYCH: AAOx3  NEUROLOGY: non-focal  SKIN: No rashes or lesions    acetaminophen     Tablet .. 650 milliGRAM(s) Oral every 6 hours PRN  aluminum hydroxide/magnesium hydroxide/simethicone Suspension 30 milliLiter(s) Oral every 4 hours PRN  cephalexin 500 milliGRAM(s) Oral four times a day  enoxaparin Injectable 40 milliGRAM(s) SubCutaneous every 24 hours  gabapentin 100 milliGRAM(s) Oral two times a day  ketorolac   Injectable 30 milliGRAM(s) IV Push every 6 hours PRN  melatonin 3 milliGRAM(s) Oral at bedtime PRN  ondansetron Injectable 4 milliGRAM(s) IV Push every 8 hours PRN  predniSONE   Tablet 60 milliGRAM(s) Oral every 24 hours  traMADol 50 milliGRAM(s) Oral every 6 hours PRN  valACYclovir 1000 milliGRAM(s) Oral three times a day                            14.9   6.15  )-----------( 159      ( 25 Feb 2023 06:35 )             44.5       02-25    133<L>  |  104  |  17  ----------------------------<  158<H>  3.9   |  23  |  1.00    Ca    9.3      25 Feb 2023 06:35  Phos  3.7     02-25  Mg     2.4     02-25    TPro  8.6<H>  /  Alb  3.6  /  TBili  0.7  /  DBili  x   /  AST  44<H>  /  ALT  52  /  AlkPhos  101  02-25

## 2023-02-25 NOTE — DISCHARGE NOTE PROVIDER - ATTENDING DISCHARGE PHYSICAL EXAMINATION:
GENERAL: NAD, well-developed  HEAD:  Atraumatic, Normocephalic, lesions over right face, and right ear as well as over chest  EYES: EOMI, PERRLA, conjunctiva and sclera clear  NECK: Supple, No JVD  CHEST/LUNG: Clear to auscultation bilaterally; No wheeze  HEART: Regular rate and rhythm; No murmurs, rubs, or gallops  ABDOMEN: Soft, Nontender, Nondistended; Bowel sounds present  EXTREMITIES:  2+ Peripheral Pulses, No clubbing, cyanosis, or edema  PSYCH: AAOx3  NEUROLOGY: decreased hearing in right ear  SKIN: No rashes or lesions

## 2023-02-25 NOTE — DISCHARGE NOTE PROVIDER - HOSPITAL COURSE
52 year old male, from home, no PMH, presents to the ED with right facial swelling, pain, and R ear swelling. Admitted to medicine for cellulitis and disseminated herpes simplex, started on Valtrex and IV Cefazolin. CT neck/soft tissue showed Cellulitis, negative for abscess. ID Dr. Diaz consulted.     Problem/Plan - 1:  ·  Problem: Disseminated herpes simplex.   ·  Plan: - p/w scattered vesicles, mainly on face, present on chest, scalp and arm  - continue Valtrex 1000mg TID for HSV  - continue Cefazolin for cellulitus/soft tissue infection  - continue gabapentin 100mg BID for neuropathic pain  - f/u HIV test  - monitor for ocular or neurological symptoms  - ID Dr. Diaz consulted.     Problem/Plan - 2:  ·  Problem: Cellulitis.   ·  Plan: - CT Neck Soft Tissue shows RIGHT buccal dermal thickening and irregularity consistent with patient's known rash. Underlying subcutaneous edema and thickening of the platysma noted consistent with cellulitis. Minimal edema in the submental triangle and about the RIGHT strap muscles. No drainable abscess is seen.  - continue Cefazolin  - f/u blood cx.   52 year old male, from home, no PMH, presents to the ED with right facial swelling, pain, and R ear swelling. Admitted to medicine for cellulitis and disseminated herpes simplex, started on Valtrex and IV Cefazolin. CT neck/soft tissue showed Cellulitis, negative for abscess. ID Dr. Diaz consulted. Pt to remain on isolation until lesions crusted. discharge discussed with Dr. Beltrán; pt medically clear for d/c home.  discharge recc:   -continue on valtrex 1000mg TID for total 7-days  -change to keflex 500mg qid for 5 days  -continue on prednisone 60mg daily for 5 days    Please note that this a brief summary of hospital course please refer to daily progress notes and consult notes for full course and events          52 year old male, from home, no PMH, presents to the ED with right facial swelling, pain, and R ear swelling. Admitted to medicine for cellulitis and disseminated herpes simplex, started on Valtrex and IV Cefazolin. CT neck/soft tissue showed Cellulitis, negative for abscess. ID Dr. Diaz consulted. Pt to remain on isolation until lesions crusted. discharge discussed with Dr. Beltrán; pt medically clear for d/c home.  discharge recc:   -continue on valtrex 1000mg TID for total 7-days  -change to keflex 500mg qid for 5 days  -continue on prednisone 60mg daily for 5 days      Please note that this a brief summary of hospital course please refer to daily progress notes and consult notes for full course and events

## 2023-02-25 NOTE — PROGRESS NOTE ADULT - ASSESSMENT
52 year old male presents to the ED with right facial and ear swelling and pain. 5 days PTA, he noticed a small pimple on his right cheek which he popped. Rash started on R side of face, ear and neck and spread to the upper chest, back, abd, both arms and legs. Lesions at various stages of evolution noted yesterday. Almost all lesions are crusted today. Pt with disseminated zoster Dx (vs varicella). Pt has no known underlying immunosuppressive disorder. Involvement of R side of face/ear with otalgia but without Cr N VII paralysis not c/w Dave Early syndrome.     Recommend:  --cont. valacyclovir to complete 7-10d   --d/c cefazolin and give keflex x7d for R earlobe cellulitis/possible bacterial suprainfection  --prednisone x10d (with tapering dose)  --airborne isolation until all lesions crust  --VZV serologies pending    Discussed above with patient and with Dr. Beltrán. Please call again if needed.

## 2023-02-25 NOTE — PROGRESS NOTE ADULT - ASSESSMENT
51yo M with no PMHx who presented with disseminated zoster infection with likely bacterial super-infection.    #Disseminated Zoster, involving multiple dermatomes  #Facial Cellulitis  #Sanches-Hunt Syndrome    -continue on valtrex 1000mg TID for total 7-days  -change to keflex 500mg qid for 5 days  -continue on prednisone 60mg daily for 5 days  -HIV negative  -patient medically improving, unclear immunization status of wife, likely discharge home tomorrow

## 2023-02-25 NOTE — DISCHARGE NOTE PROVIDER - NSDCMRMEDTOKEN_GEN_ALL_CORE_FT
cephalexin 500 mg oral capsule: 1 cap(s) orally 4 times a day  gabapentin 100 mg oral capsule: 1 cap(s) orally 3 times a day   mupirocin 2% topical ointment: 1 application in each nostril 2 times a day   predniSONE 20 mg oral tablet: 3 tab(s) orally every 24 hours  valACYclovir 1 g oral tablet: 1 tab(s) orally 3 times a day   cephalexin 500 mg oral capsule: 1 cap(s) orally 4 times a day  gabapentin 100 mg oral capsule: 1 cap(s) orally 3 times a day   predniSONE 20 mg oral tablet: 3 tab(s) orally every 24 hours  valACYclovir 1 g oral tablet: 1 tab(s) orally 3 times a day

## 2023-02-25 NOTE — DISCHARGE NOTE PROVIDER - NSDCQMCOGNITION_NEU_ALL_CORE
[FreeTextEntry1] : This note was written by Elle Sawyer on 02/18/2020 acting as scribe for Dr. Mtz. 
No difficulties

## 2023-02-25 NOTE — DISCHARGE NOTE PROVIDER - CARE PROVIDER_API CALL
[facility]  [address] [id number]  Phone: [phone]  Fax: [phone]  Established Patient  Follow Up Time: 2 weeks

## 2023-02-26 ENCOUNTER — TRANSCRIPTION ENCOUNTER (OUTPATIENT)
Age: 53
End: 2023-02-26

## 2023-02-26 VITALS
DIASTOLIC BLOOD PRESSURE: 71 MMHG | OXYGEN SATURATION: 99 % | SYSTOLIC BLOOD PRESSURE: 116 MMHG | HEART RATE: 74 BPM | TEMPERATURE: 98 F | RESPIRATION RATE: 16 BRPM

## 2023-02-26 LAB
VZV IGG FLD QL IA: >4000 INDEX — SIGNIFICANT CHANGE UP
VZV IGG FLD QL IA: POSITIVE — SIGNIFICANT CHANGE UP

## 2023-02-26 PROCEDURE — 87040 BLOOD CULTURE FOR BACTERIA: CPT

## 2023-02-26 PROCEDURE — 70491 CT SOFT TISSUE NECK W/DYE: CPT | Mod: MA

## 2023-02-26 PROCEDURE — 87640 STAPH A DNA AMP PROBE: CPT

## 2023-02-26 PROCEDURE — 85025 COMPLETE CBC W/AUTO DIFF WBC: CPT

## 2023-02-26 PROCEDURE — 87641 MR-STAPH DNA AMP PROBE: CPT

## 2023-02-26 PROCEDURE — 99239 HOSP IP/OBS DSCHRG MGMT >30: CPT

## 2023-02-26 PROCEDURE — 86787 VARICELLA-ZOSTER ANTIBODY: CPT

## 2023-02-26 PROCEDURE — 99285 EMERGENCY DEPT VISIT HI MDM: CPT

## 2023-02-26 PROCEDURE — 96375 TX/PRO/DX INJ NEW DRUG ADDON: CPT

## 2023-02-26 PROCEDURE — 83036 HEMOGLOBIN GLYCOSYLATED A1C: CPT

## 2023-02-26 PROCEDURE — 96368 THER/DIAG CONCURRENT INF: CPT

## 2023-02-26 PROCEDURE — 87389 HIV-1 AG W/HIV-1&-2 AB AG IA: CPT

## 2023-02-26 PROCEDURE — 83735 ASSAY OF MAGNESIUM: CPT

## 2023-02-26 PROCEDURE — 96365 THER/PROPH/DIAG IV INF INIT: CPT

## 2023-02-26 PROCEDURE — 84100 ASSAY OF PHOSPHORUS: CPT

## 2023-02-26 PROCEDURE — 83605 ASSAY OF LACTIC ACID: CPT

## 2023-02-26 PROCEDURE — 85027 COMPLETE CBC AUTOMATED: CPT

## 2023-02-26 PROCEDURE — 80053 COMPREHEN METABOLIC PANEL: CPT

## 2023-02-26 PROCEDURE — 36415 COLL VENOUS BLD VENIPUNCTURE: CPT

## 2023-02-26 PROCEDURE — 87635 SARS-COV-2 COVID-19 AMP PRB: CPT

## 2023-02-26 RX ORDER — MUPIROCIN 20 MG/G
1 OINTMENT TOPICAL
Refills: 0 | Status: DISCONTINUED | OUTPATIENT
Start: 2023-02-26 | End: 2023-02-26

## 2023-02-26 RX ORDER — VALACYCLOVIR 500 MG/1
1 TABLET, FILM COATED ORAL
Qty: 12 | Refills: 0
Start: 2023-02-26 | End: 2023-03-01

## 2023-02-26 RX ORDER — GABAPENTIN 400 MG/1
1 CAPSULE ORAL
Qty: 42 | Refills: 0
Start: 2023-02-26 | End: 2023-03-11

## 2023-02-26 RX ORDER — CHLORHEXIDINE GLUCONATE 213 G/1000ML
1 SOLUTION TOPICAL
Refills: 0 | Status: DISCONTINUED | OUTPATIENT
Start: 2023-02-26 | End: 2023-02-26

## 2023-02-26 RX ORDER — CEPHALEXIN 500 MG
1 CAPSULE ORAL
Qty: 20 | Refills: 0
Start: 2023-02-26 | End: 2023-03-02

## 2023-02-26 RX ORDER — MUPIROCIN 20 MG/G
1 OINTMENT TOPICAL
Qty: 1 | Refills: 0
Start: 2023-02-26 | End: 2023-03-02

## 2023-02-26 RX ADMIN — ENOXAPARIN SODIUM 40 MILLIGRAM(S): 100 INJECTION SUBCUTANEOUS at 12:39

## 2023-02-26 RX ADMIN — GABAPENTIN 100 MILLIGRAM(S): 400 CAPSULE ORAL at 05:22

## 2023-02-26 RX ADMIN — Medication 500 MILLIGRAM(S): at 05:23

## 2023-02-26 RX ADMIN — VALACYCLOVIR 1000 MILLIGRAM(S): 500 TABLET, FILM COATED ORAL at 05:23

## 2023-02-26 RX ADMIN — Medication 500 MILLIGRAM(S): at 12:39

## 2023-02-26 NOTE — DISCHARGE NOTE NURSING/CASE MANAGEMENT/SOCIAL WORK - PATIENT PORTAL LINK FT
You can access the FollowMyHealth Patient Portal offered by [facility] by registering at the following website: [email or web address]. By joining [employer]’s FollowMyHealth portal, you will also be able to view your health information using other applications (apps) compatible with our system.

## 2023-02-27 LAB — VZV IGM SER-ACNC: <0.91 INDEX — SIGNIFICANT CHANGE UP (ref 0–0.9)

## 2023-02-28 LAB
CULTURE RESULTS: SIGNIFICANT CHANGE UP
CULTURE RESULTS: SIGNIFICANT CHANGE UP
SPECIMEN SOURCE: SIGNIFICANT CHANGE UP
SPECIMEN SOURCE: SIGNIFICANT CHANGE UP

## 2023-03-03 ENCOUNTER — EMERGENCY (EMERGENCY)
Facility: HOSPITAL | Age: 53
LOS: 1 days | Discharge: HOME | End: 2023-03-03
Attending: EMERGENCY MEDICINE
Payer: COMMERCIAL

## 2023-03-03 VITALS
OXYGEN SATURATION: 97 % | SYSTOLIC BLOOD PRESSURE: 138 MMHG | DIASTOLIC BLOOD PRESSURE: 80 MMHG | WEIGHT: 219.58 LBS | HEART RATE: 64 BPM | HEIGHT: 67 IN | TEMPERATURE: 98 F | RESPIRATION RATE: 18 BRPM

## 2023-03-03 VITALS
TEMPERATURE: 98 F | DIASTOLIC BLOOD PRESSURE: 73 MMHG | OXYGEN SATURATION: 98 % | SYSTOLIC BLOOD PRESSURE: 119 MMHG | HEART RATE: 60 BPM | RESPIRATION RATE: 16 BRPM

## 2023-03-03 DIAGNOSIS — Z98.890 OTHER SPECIFIED POSTPROCEDURAL STATES: Chronic | ICD-10-CM

## 2023-03-03 PROCEDURE — 99284 EMERGENCY DEPT VISIT MOD MDM: CPT | Mod: 25

## 2023-03-03 PROCEDURE — 70450 CT HEAD/BRAIN W/O DYE: CPT | Mod: MA

## 2023-03-03 PROCEDURE — 70450 CT HEAD/BRAIN W/O DYE: CPT | Mod: 26,MA

## 2023-03-03 PROCEDURE — 99284 EMERGENCY DEPT VISIT MOD MDM: CPT

## 2023-03-03 PROCEDURE — 93010 ELECTROCARDIOGRAM REPORT: CPT

## 2023-03-03 PROCEDURE — 93005 ELECTROCARDIOGRAM TRACING: CPT

## 2023-03-03 PROCEDURE — 82962 GLUCOSE BLOOD TEST: CPT

## 2023-03-03 RX ORDER — FLUORESCEIN SODIUM 9 MG
1 STRIP OPHTHALMIC (EYE) ONCE
Refills: 0 | Status: COMPLETED | OUTPATIENT
Start: 2023-03-03 | End: 2023-03-03

## 2023-03-03 RX ADMIN — Medication 1 APPLICATION(S): at 13:56

## 2023-03-03 NOTE — ED PROVIDER NOTE - PHYSICAL EXAMINATION
No distress.  Right facial/maxillary area with crusted lesions consistent with healing facial shingles.  No increased fluorescein uptake in dendritic pattern noted to right eye.  OD/OS 20/20.  Extra ocular muscles intact, pupils 3mm and reactive to light, no photophobia, no pain with eye movement, no hyphema   No lesions to right inner ear or at nasal tip.  Right eyelid lag with blinking and decreased nasolabial fold.  No motor weakness of upper and lower extremities, no ataxia, normal gait, strength 5 out of 5, no dysarthria

## 2023-03-03 NOTE — ED PROVIDER NOTE - NSFOLLOWUPCLINICS_GEN_ALL_ED_FT
[facility]  Internal Medicine  [address] [place] [address]  Phone: [phone]  Fax: [phone]    [facility]  Neurology  [address] [place] [address]  Phone: [phone]  Fax: [phone]

## 2023-03-03 NOTE — ED PROVIDER NOTE - OBJECTIVE STATEMENT
52-year-old male admitted on February 23 for facial herpes zoster and discharge on February 26, 2023 with 5-day course of prednisone 60 mg which completed today and Valtrex 1 g 3 times daily for 10 days which is complete on March 8.  Patient states he woke up yesterday at 7:30 AM and noted right-sided facial weakness/droop and inability to close right eyelids completely.  No headache, no vision changes, no nausea, no vomiting, no hearing loss.

## 2023-03-03 NOTE — ED PROVIDER NOTE - NSFOLLOWUPINSTRUCTIONS_ED_ALL_ED_FT
Continue with valacyclovir as prescribed.  Start tapered Prednisone as prescribed today.  Keep you eye moist with prescribed drops.   Bell's palsy is a short-term inability to move muscles in a part of the face. The inability to move, also called paralysis, results from inflammation or compression of the seventh cranial nerve. This nerve travels along the skull and under the ear to the side of the face. This nerve is responsible for facial movements that include blinking, closing the eyes, smiling, and frowning.      What are the causes?    The exact cause of this condition is not known. It may be caused by an infection from a virus, such as the chickenpox (herpes zoster), Faith–Barr, or mumps virus.      What increases the risk?    You are more likely to develop this condition if:  •You are pregnant.      •You have diabetes.      •You have had a recent infection in your nose, throat, or airways.      •You have a weakened body defense system (immune system).      •You have had a facial injury, such as a fracture.      •You have a family history of Bell's palsy.        What are the signs or symptoms?    Symptoms of this condition include:  •Weakness on one side of the face.      •Drooping eyelid and corner of the mouth.      •Excessive tearing in one eye.      •Difficulty closing the eyelid.      •Dry eye.      •Drooling.      •Dry mouth.      •Changes in taste.      •Change in facial appearance.      •Pain behind one ear.      •Ringing in one or both ears.      •Sensitivity to sound in one ear.      •Facial twitching.      •Headache.      •Impaired speech.      •Dizziness.      •Difficulty eating or drinking.      Most of the time, only one side of the face is affected. In rare cases, Bell's palsy may affect the whole face.      How is this diagnosed?    This condition is diagnosed based on:  •Your symptoms.      •Your medical history.      •A physical exam.      You may also have to see health care providers who specialize in disorders of the nerves (neurologist) or diseases and conditions of the eye (ophthalmologist). You may have tests, such as:  •A test to check for nerve damage (electromyogram).      •Imaging studies, such as a CT scan or an MRI.      •Blood tests.        How is this treated?    This condition affects every person differently. Sometimes symptoms go away without treatment within a couple weeks. If treatment is needed, it varies from person to person. The goal of treatment is to reduce inflammation and protect the eye from damage. Treatment for Bell's palsy may include:•Medicines, such as:  •Steroids to reduce swelling and inflammation.      •Antiviral medicines.      •Pain relievers, including aspirin, acetaminophen, or ibuprofen.        •Eye drops or ointment to keep your eye moist.      •Eye protection, if you cannot close your eye.      •Exercises or massage to regain muscle strength and function (physical therapy).        Follow these instructions at home:     •Take over-the-counter and prescription medicines only as told by your health care provider.    •If your eye is affected:  •Keep your eye moist with eye drops or ointment as told by your health care provider.      •Follow instructions for eye care and protection as told by your health care provider.        •Do any physical therapy exercises as told by your health care provider.      •Keep all follow-up visits. This is important.        Contact a health care provider if:    •You have a fever or chills.      •Your symptoms do not get better within 2–3 weeks, or your symptoms get worse.      •Your eye is red, irritated, or painful.      •You have new symptoms.        Get help right away if:    •You have weakness or numbness in a part of your body other than your face.      •You have trouble swallowing.      •You develop neck pain or stiffness.      •You develop dizziness or shortness of breath.        Summary    •Bell's palsy is a short-term inability to move muscles in a part of the face. The inability to move results from inflammation or compression of the facial nerve.      •This condition affects every person differently. Sometimes symptoms go away without treatment within a couple weeks.      •If treatment is needed, it varies from person to person. The goal of treatment is to reduce inflammation and protect the eye from damage.      •Contact your health care provider if your symptoms do not get better within 2–3 weeks, or your symptoms get worse.      This information is not intended to replace advice given to you by your health care provider. Make sure you discuss any questions you have with your health care provider.

## 2023-03-03 NOTE — ED ADULT NURSE NOTE - OBJECTIVE STATEMENT
patient presents to ED with c/o right side facial droop and blurry vision since yesterday 7AM, denies pain, no SOB, no weakness

## 2023-03-03 NOTE — ED PROVIDER NOTE - PATIENT PORTAL LINK FT
Medicated pt per orders. Pt resting on stretcher in POC with call bell in reach. Pt remains on monitor x 2. VSS at this time. You can access the FollowMyHealth Patient Portal offered by [facility] by registering at the following website: [email or web address]. By joining [employer]’s FollowMyHealth portal, you will also be able to view your health information using other applications (apps) compatible with our system.

## 2023-03-03 NOTE — ED PROVIDER NOTE - CLINICAL SUMMARY MEDICAL DECISION MAKING FREE TEXT BOX
Patient's symptoms are consistent with Bell's palsy status post shoulder zoster.  Patient completed 5-day course of 60 mg of prednisone today however given onset of Bell's palsy I will continue to taper dose of prednisone.  Patient will continue with prescribed Valtrex until March 8.  Patient will also require artificial tears/Lacri-Lube.  Will provide patient with neuro follow-up.  Given patient's age greater than 50 I will also do CT of head to rule out any intracranial pathology.  Patient is otherwise in no distress.  Patient agrees with current plan. Patient's symptoms are consistent with Bell's palsy status post shoulder zoster.  Patient completed 5-day course of 60 mg of prednisone today however given onset of Bell's palsy I will continue to taper dose of prednisone.  Patient will continue with prescribed Valtrex until March 8.  Patient will also require artificial tears/Lacri-Lube.  Will provide patient with neuro follow-up.  Given patient's age greater than 50,  I will also do CT of head to rule out any intracranial pathology. Patient is otherwise in no distress.  Patient agrees with current plan.  CT head reported no intracranial pathology.  310p Pt is well appearing, has no new complaints and able to walk with normal gait. Pt is stable for discharge and follow up with medical doctor. Pt educated on care and need for follow up. Discussed anticipatory guidance and return precautions. Questions answered. I had a detailed discussion with the patient regarding the historical points, exam findings, and any diagnostic results supporting the discharge diagnosis.

## 2023-03-03 NOTE — ED ADULT TRIAGE NOTE - CHIEF COMPLAINT QUOTE
right side facial droop as per pt he noticed it yesterday when he woke up at 7:30 am, unable to close right eye  , speech is clear

## 2023-03-06 PROBLEM — Z78.9 OTHER SPECIFIED HEALTH STATUS: Chronic | Status: ACTIVE | Noted: 2023-03-03

## 2023-07-17 ENCOUNTER — APPOINTMENT (OUTPATIENT)
Dept: NEUROLOGY | Facility: CLINIC | Age: 53
End: 2023-07-17

## 2024-04-05 NOTE — ASU DISCHARGE PLAN (ADULT/PEDIATRIC) - DO NOT DRIVE IF TAKING PAIN MEDICATION
Left detailed message per 4/3 encounter [facility] and we will contact them regarding side effects of depakote once dr mckinney responds.    See other encounter from 4/3 regarding side effects   NULL

## 2024-06-10 NOTE — H&P PST ADULT - NEGATIVE ALLERGY TYPES
Declines no outdoor environmental allergies/no indoor environmental allergies/no reactions to medicines/no reactions to animals/no reactions to food/no reactions to insect bites

## 2024-06-22 NOTE — CONSULT LETTER
[Dear  ___] : Dear  [unfilled], [Consult Letter:] : I had the pleasure of evaluating your patient, [unfilled]. [Consult Closing:] : Thank you very much for allowing me to participate in the care of this patient.  If you have any questions, please do not hesitate to contact me. [Sincerely,] : Sincerely, [FreeTextEntry3] : Javy Blanton MD\par  Yes